# Patient Record
Sex: FEMALE | Race: BLACK OR AFRICAN AMERICAN | Employment: OTHER | URBAN - METROPOLITAN AREA
[De-identification: names, ages, dates, MRNs, and addresses within clinical notes are randomized per-mention and may not be internally consistent; named-entity substitution may affect disease eponyms.]

---

## 2017-01-05 DIAGNOSIS — M62.838 MUSCLE SPASM: ICD-10-CM

## 2017-01-05 DIAGNOSIS — M06.9 RHEUMATOID ARTHRITIS, INVOLVING UNSPECIFIED SITE, UNSPECIFIED RHEUMATOID FACTOR PRESENCE: Primary | ICD-10-CM

## 2017-01-05 RX ORDER — CYCLOBENZAPRINE HCL 10 MG
TABLET ORAL
Qty: 90 TAB | Refills: 3 | Status: SHIPPED | COMMUNITY
Start: 2017-01-05 | End: 2017-07-07 | Stop reason: SDUPTHER

## 2017-01-05 RX ORDER — PAROXETINE HYDROCHLORIDE 20 MG/1
TABLET, FILM COATED ORAL
Qty: 90 TAB | Refills: 1 | Status: SHIPPED | OUTPATIENT
Start: 2017-01-05 | End: 2017-07-05 | Stop reason: SDUPTHER

## 2017-01-05 RX ORDER — METFORMIN HYDROCHLORIDE 500 MG/1
TABLET, EXTENDED RELEASE ORAL
Qty: 180 TAB | Refills: 1 | Status: SHIPPED | OUTPATIENT
Start: 2017-01-05 | End: 2017-07-05 | Stop reason: SDUPTHER

## 2017-01-20 DIAGNOSIS — G47.00 INSOMNIA, UNSPECIFIED TYPE: ICD-10-CM

## 2017-01-23 RX ORDER — ZOLPIDEM TARTRATE 6.25 MG/1
TABLET, FILM COATED, EXTENDED RELEASE ORAL
Qty: 90 TAB | Refills: 0 | Status: SHIPPED | OUTPATIENT
Start: 2017-01-23 | End: 2017-03-21 | Stop reason: SDUPTHER

## 2017-02-02 RX ORDER — MELOXICAM 7.5 MG/1
TABLET ORAL
Qty: 90 TAB | Refills: 0 | Status: SHIPPED | OUTPATIENT
Start: 2017-02-02 | End: 2017-05-01 | Stop reason: SDUPTHER

## 2017-03-20 RX ORDER — VALSARTAN AND HYDROCHLOROTHIAZIDE 80; 12.5 MG/1; MG/1
TABLET, FILM COATED ORAL
Qty: 90 TAB | Refills: 0 | Status: SHIPPED | OUTPATIENT
Start: 2017-03-20 | End: 2017-06-19 | Stop reason: SDUPTHER

## 2017-03-21 DIAGNOSIS — G47.00 INSOMNIA, UNSPECIFIED TYPE: ICD-10-CM

## 2017-03-23 RX ORDER — ZOLPIDEM TARTRATE 6.25 MG/1
TABLET, FILM COATED, EXTENDED RELEASE ORAL
Qty: 90 TAB | Refills: 0 | Status: SHIPPED | OUTPATIENT
Start: 2017-03-23 | End: 2017-07-17 | Stop reason: SDUPTHER

## 2017-05-01 RX ORDER — MELOXICAM 7.5 MG/1
TABLET ORAL
Qty: 90 TAB | Refills: 0 | Status: SHIPPED | OUTPATIENT
Start: 2017-05-01 | End: 2017-05-08 | Stop reason: SDUPTHER

## 2017-06-19 RX ORDER — VALSARTAN AND HYDROCHLOROTHIAZIDE 80; 12.5 MG/1; MG/1
TABLET, FILM COATED ORAL
Qty: 90 TAB | Refills: 0 | Status: SHIPPED | OUTPATIENT
Start: 2017-06-19 | End: 2017-09-22 | Stop reason: SDUPTHER

## 2017-06-19 RX ORDER — MONTELUKAST SODIUM 10 MG/1
TABLET ORAL
Qty: 90 TAB | Refills: 0 | Status: SHIPPED | OUTPATIENT
Start: 2017-06-19 | End: 2017-09-18 | Stop reason: SDUPTHER

## 2017-07-05 RX ORDER — PAROXETINE HYDROCHLORIDE 20 MG/1
TABLET, FILM COATED ORAL
Qty: 90 TAB | Refills: 0 | Status: SHIPPED | OUTPATIENT
Start: 2017-07-05 | End: 2017-10-03 | Stop reason: SDUPTHER

## 2017-07-05 RX ORDER — METFORMIN HYDROCHLORIDE 500 MG/1
TABLET, EXTENDED RELEASE ORAL
Qty: 180 TAB | Refills: 0 | Status: SHIPPED | OUTPATIENT
Start: 2017-07-05 | End: 2017-10-03 | Stop reason: SDUPTHER

## 2017-07-17 DIAGNOSIS — G47.00 INSOMNIA, UNSPECIFIED TYPE: ICD-10-CM

## 2017-07-17 RX ORDER — ZOLPIDEM TARTRATE 6.25 MG/1
TABLET, FILM COATED, EXTENDED RELEASE ORAL
Qty: 90 TAB | Refills: 0 | Status: SHIPPED | OUTPATIENT
Start: 2017-07-17 | End: 2017-10-17 | Stop reason: SDUPTHER

## 2017-09-18 RX ORDER — MONTELUKAST SODIUM 10 MG/1
TABLET ORAL
Qty: 90 TAB | Refills: 0 | Status: SHIPPED | OUTPATIENT
Start: 2017-09-18 | End: 2018-03-15 | Stop reason: SDUPTHER

## 2017-09-19 DIAGNOSIS — G47.00 INSOMNIA, UNSPECIFIED TYPE: ICD-10-CM

## 2017-09-20 RX ORDER — VALSARTAN AND HYDROCHLOROTHIAZIDE 80; 12.5 MG/1; MG/1
TABLET, FILM COATED ORAL
Qty: 90 TAB | Refills: 0 | OUTPATIENT
Start: 2017-09-20

## 2017-09-22 RX ORDER — VALSARTAN AND HYDROCHLOROTHIAZIDE 80; 12.5 MG/1; MG/1
TABLET, FILM COATED ORAL
Qty: 90 TAB | Refills: 3 | Status: SHIPPED | OUTPATIENT
Start: 2017-09-22 | End: 2018-09-17 | Stop reason: SDUPTHER

## 2017-09-28 DIAGNOSIS — M06.9 RHEUMATOID ARTHRITIS, INVOLVING UNSPECIFIED SITE, UNSPECIFIED RHEUMATOID FACTOR PRESENCE: ICD-10-CM

## 2017-09-28 DIAGNOSIS — M62.838 MUSCLE SPASM: ICD-10-CM

## 2017-09-30 RX ORDER — CYCLOBENZAPRINE HCL 10 MG
TABLET ORAL
Qty: 90 TAB | Refills: 1 | Status: SHIPPED | OUTPATIENT
Start: 2017-09-30 | End: 2017-12-29 | Stop reason: SDUPTHER

## 2017-10-17 DIAGNOSIS — G47.00 INSOMNIA, UNSPECIFIED TYPE: ICD-10-CM

## 2017-10-17 RX ORDER — ZOLPIDEM TARTRATE 6.25 MG/1
TABLET, FILM COATED, EXTENDED RELEASE ORAL
Qty: 90 TAB | Refills: 0 | Status: SHIPPED | OUTPATIENT
Start: 2017-10-17 | End: 2017-10-23 | Stop reason: SDUPTHER

## 2017-10-23 DIAGNOSIS — G47.00 INSOMNIA, UNSPECIFIED TYPE: ICD-10-CM

## 2017-10-23 RX ORDER — ZOLPIDEM TARTRATE 6.25 MG/1
TABLET, FILM COATED, EXTENDED RELEASE ORAL
Qty: 90 TAB | Refills: 0 | Status: SHIPPED | OUTPATIENT
Start: 2017-10-23 | End: 2018-01-17 | Stop reason: SDUPTHER

## 2017-12-08 RX ORDER — MELOXICAM 7.5 MG/1
7.5 TABLET ORAL
Qty: 90 TAB | Refills: 0 | OUTPATIENT
Start: 2017-12-08

## 2017-12-08 NOTE — TELEPHONE ENCOUNTER
Requested Prescriptions     Pending Prescriptions Disp Refills    meloxicam (MOBIC) 7.5 mg tablet 90 Tab 0     Sig: Take 1 Tab by mouth daily as needed for Pain. Take with food. APPOINTMENT IS REQUIRED BEFORE NEXT REFILL.         Made pt aware of prior note from Dr Tami Funes needed before next refill\"     Pt said have ov scheduled for 12/26/2017

## 2018-01-17 ENCOUNTER — OFFICE VISIT (OUTPATIENT)
Dept: FAMILY MEDICINE CLINIC | Age: 63
End: 2018-01-17

## 2018-01-17 VITALS
BODY MASS INDEX: 22.05 KG/M2 | RESPIRATION RATE: 20 BRPM | HEART RATE: 101 BPM | SYSTOLIC BLOOD PRESSURE: 114 MMHG | DIASTOLIC BLOOD PRESSURE: 71 MMHG | WEIGHT: 154 LBS | OXYGEN SATURATION: 98 % | HEIGHT: 70 IN | TEMPERATURE: 98.6 F

## 2018-01-17 DIAGNOSIS — J30.1 SEASONAL ALLERGIC RHINITIS DUE TO POLLEN, UNSPECIFIED CHRONICITY: ICD-10-CM

## 2018-01-17 DIAGNOSIS — G47.00 INSOMNIA, UNSPECIFIED TYPE: ICD-10-CM

## 2018-01-17 DIAGNOSIS — J00 COMMON COLD: ICD-10-CM

## 2018-01-17 DIAGNOSIS — M06.9 RHEUMATOID ARTHRITIS, INVOLVING UNSPECIFIED SITE, UNSPECIFIED RHEUMATOID FACTOR PRESENCE: Primary | ICD-10-CM

## 2018-01-17 RX ORDER — ZOLPIDEM TARTRATE 6.25 MG/1
TABLET, FILM COATED, EXTENDED RELEASE ORAL
Qty: 90 TAB | Refills: 0 | Status: SHIPPED | OUTPATIENT
Start: 2018-01-17 | End: 2018-06-19 | Stop reason: SDUPTHER

## 2018-01-17 RX ORDER — MELOXICAM 7.5 MG/1
TABLET ORAL
Qty: 90 TAB | Refills: 0 | Status: SHIPPED | OUTPATIENT
Start: 2018-01-17 | End: 2018-03-15 | Stop reason: SDUPTHER

## 2018-01-17 RX ORDER — FLUTICASONE PROPIONATE 50 MCG
SPRAY, SUSPENSION (ML) NASAL
Qty: 48 G | Refills: 0 | Status: SHIPPED | OUTPATIENT
Start: 2018-01-17 | End: 2019-04-12 | Stop reason: SDUPTHER

## 2018-01-17 NOTE — MR AVS SNAPSHOT
[address] [place] [address] 
 
 
 [address] [address] [address] [address] 
[phone] Patient: Armand Chirinos 
MRN: [id number]:[id number] Visit Information Date & Time Provider Department Dept. Phone Encounter #  
 1/17/2018 11:00 AM Jorge Calderón NP [address] [place] [address] [id number] Upcoming Health Maintenance Date Due  
 EYE EXAM RETINAL OR DILATED Q1 11/12/1964 BREAST CANCER SCRN MAMMOGRAM 5/9/2013 ZOSTER VACCINE AGE 60> 9/12/2014 PAP AKA CERVICAL CYTOLOGY 11/24/2015 HEMOGLOBIN A1C Q6M 2/10/2017 FOOT EXAM Q1 7/14/2017 Influenza Age 5 to Adult 8/1/2017 MICROALBUMIN Q1 8/10/2017 LIPID PANEL Q1 12/22/2017 DTaP/Tdap/Td series (2 - Td) 11/24/2024 COLONOSCOPY 3/3/2025 Allergies as of 1/17/2018  Review Complete On: 1/17/2018 By: Radha Salgado Severity Noted Reaction Type Reactions Grass Pollen Low 09/29/2015    Runny Nose, Sneezing Current Immunizations  Reviewed on 12/22/2016 Name Date Influenza Vaccine (Quad) PF 12/22/2016, 8/25/2015 Influenza Vaccine PF 9/27/2013 Pneumococcal Conjugate (PCV-13) 10/22/2015 Tdap 11/24/2014 Not reviewed this visit You Were Diagnosed With   
  
 Codes Comments Rheumatoid arthritis, involving unspecified site, unspecified rheumatoid factor presence ([facility]    -  Primary ICD-10-CM: M06.9 ICD-9-CM: 714.0 Insomnia, unspecified type     ICD-10-CM: G47.00 ICD-9-CM: 780.52 Seasonal allergic rhinitis due to pollen, unspecified chronicity     ICD-10-CM: J30.1 ICD-9-CM: 477.0 Common cold     ICD-10-CM: Gwenezhao Mcekon ICD-9-CM: 619 Vitals BP Pulse Temp Resp Height(growth percentile) Weight(growth percentile) 114/71 (BP 1 Location: Right arm, BP Patient Position: Sitting) (!) 101 98.6 °F (37 °C) (Oral) 20 5' 10\" (1.778 m) 154 lb (69.9 kg) LMP SpO2 BMI OB Status Smoking Status 01/20/2007 98% 22.1 kg/m2 Postmenopausal Never Smoker BMI and BSA Data Body Mass Index Body Surface Area  
 22.1 kg/m 2 1.86 m 2 Preferred Pharmacy Pharmacy Name Phone [facility] [phone] Your Updated Medication List  
  
   
This list is accurate as of: 1/17/18 11:34 AM.  Always use your most recent med list.  
  
  
  
  
 acetaminophen-codeine 300-30 mg per tablet Commonly known as:  TYLENOL #3  
TAKE 1 TABLET BY MOUTH EVERY 6 HOURS AS NEEDED FOR PAIN  
  
 ALLEGRA PO Take 180 mg by mouth daily as needed. aspirin 81 mg chewable tablet Take 81 mg by mouth daily. Blood-Glucose Meter monitoring kit Freestyle meter. Check fasting glucose daily. cyclobenzaprine 10 mg tablet Commonly known as:  FLEXERIL Take 1 Tab by mouth three (3) times daily as needed for Muscle Spasm(s). Needs appointment no further refills until appointment  
  
 fluticasone 50 mcg/actuation nasal spray Commonly known as:  FLONASE  
USE 2 SPRAYS IN EACH NOSTRIL ONCE DAILY IF NEEDED FOR RUNNY NOSE OR ALLERGIES  
  
 glucose blood VI test strips strip Commonly known as:  blood glucose test  
To use with freestyle meter Lancets Misc Check glucose 2 times daily Lancing Device with Lancets Kit To use with freestyle meter  
  
 meloxicam 7.5 mg tablet Commonly known as:  MOBIC Take with food. metFORMIN  mg tablet Commonly known as:  GLUCOPHAGE XR  
TAKE 1 TABLET TWICE A DAY WITH MEALS  
  
 montelukast 10 mg tablet Commonly known as:  SINGULAIR  
TAKE 1 TABLET NIGHTLY PARoxetine 20 mg tablet Commonly known as:  PAXIL TAKE 1 TABLET DAILY  
  
 valsartan-hydroCHLOROthiazide 80-12.5 mg per tablet Commonly known as:  DIOVAN-HCT  
TAKE 1 TABLET DAILY  
  
 zolpidem CR 6.25 mg tablet Commonly known as:  AMBIEN CR  
take 1 tablet by mouth at bedtime for sleep Prescriptions Printed Refills  
 zolpidem CR (AMBIEN CR) 6.25 mg tablet 0 Sig: take 1 tablet by mouth at bedtime for sleep Class: Print Prescriptions Sent to Pharmacy Refills  
 meloxicam (MOBIC) 7.5 mg tablet 0 Sig: Take with food. Class: Normal  
 Pharmacy: [address], [address] [place] [address] Ph #: [phone]  
 fluticasone (FLONASE) 50 mcg/actuation nasal spray 0 Sig: USE 2 SPRAYS IN EACH NOSTRIL ONCE DAILY IF NEEDED FOR RUNNY NOSE OR ALLERGIES Class: Normal  
 Pharmacy: [address], [address] Ph #: [phone] Patient Instructions May take tylenol as every 4-6 hours as needed for pain. Pt advised no NSAIDS (ibuprophen, advil, Motrin, Aleve) while taking Mobic. Introducing [address]! Merle Mancilla introduces [employer] patient portal. Now you can access parts of your medical record, email your doctor's office, and request medication refills online. 1. In your internet browser, go to [email or web address] 2. Click on the First Time User? Click Here link in the Sign In box. You will see the New Member Sign Up page. 3. Enter your [employer] Access Code exactly as it appears below. You will not need to use this code after youve completed the sign-up process. If you do not sign up before the expiration date, you must request a new code. · [employer] Access Code: [email or web address] Expires: 4/17/2018 11:34 AM 
 
4. Enter the last four digits of your Social Security Number (xxxx) and Date of Birth (mm/dd/yyyy) as indicated and click Submit. You will be taken to the next sign-up page. 5. Create a TFG Card Solutionst ID. This will be your [employer] login ID and cannot be changed, so think of one that is secure and easy to remember. 6. Create a [employer] password. You can change your password at any time. 7. Enter your Password Reset Question and Answer.  This can be used at a later time if you forget your password. 8. Enter your e-mail address. You will receive e-mail notification when new information is available in [address] 9. Click Sign Up. You can now view and download portions of your medical record. 10. Click the Download Summary menu link to download a portable copy of your medical information. If you have questions, please visit the Frequently Asked Questions section of the [employer] website. Remember, [employer] is NOT to be used for urgent needs. For medical emergencies, dial 911. Now available from your iPhone and Android! Please provide this summary of care documentation to your next provider. Your primary care clinician is listed as [address]. If you have any questions after today's visit, please call [phone].

## 2018-01-17 NOTE — PATIENT INSTRUCTIONS
May take tylenol as every 4-6 hours as needed for pain. Pt advised no NSAIDS (ibuprophen, advil, Motrin, Aleve) while taking Mobic.

## 2018-01-17 NOTE — PROGRESS NOTES
1. Have you been to the ER, urgent care clinic since your last visit? Hospitalized since your last visit? Yes When: 1/7/18 for URI and arthritis pain at [facility] ER    2. Have you seen or consulted any other health care providers outside of the [address] [place] [address] since your last visit? Include any pap smears or colon screening.  No

## 2018-02-18 DIAGNOSIS — M62.838 MUSCLE SPASM: ICD-10-CM

## 2018-02-18 DIAGNOSIS — M06.9 RHEUMATOID ARTHRITIS, INVOLVING UNSPECIFIED SITE, UNSPECIFIED RHEUMATOID FACTOR PRESENCE: ICD-10-CM

## 2018-02-19 RX ORDER — CYCLOBENZAPRINE HCL 10 MG
TABLET ORAL
Qty: 90 TAB | Refills: 0 | OUTPATIENT
Start: 2018-02-19

## 2018-03-08 DIAGNOSIS — M06.9 RHEUMATOID ARTHRITIS, INVOLVING UNSPECIFIED SITE, UNSPECIFIED RHEUMATOID FACTOR PRESENCE: ICD-10-CM

## 2018-03-08 DIAGNOSIS — M62.838 MUSCLE SPASM: ICD-10-CM

## 2018-03-08 RX ORDER — CYCLOBENZAPRINE HCL 10 MG
10 TABLET ORAL
Qty: 30 TAB | Refills: 0 | OUTPATIENT
Start: 2018-03-08

## 2018-03-08 RX ORDER — METFORMIN HYDROCHLORIDE 500 MG/1
TABLET, EXTENDED RELEASE ORAL
Qty: 180 TAB | Refills: 0 | OUTPATIENT
Start: 2018-03-08

## 2018-03-08 RX ORDER — PAROXETINE HYDROCHLORIDE 20 MG/1
TABLET, FILM COATED ORAL
Qty: 90 TAB | Refills: 0 | OUTPATIENT
Start: 2018-03-08

## 2018-03-09 RX ORDER — METFORMIN HYDROCHLORIDE 500 MG/1
TABLET, EXTENDED RELEASE ORAL
Qty: 60 TAB | Refills: 0 | OUTPATIENT
Start: 2018-03-09

## 2018-03-09 RX ORDER — CYCLOBENZAPRINE HCL 10 MG
10 TABLET ORAL
Qty: 90 TAB | Refills: 0 | OUTPATIENT
Start: 2018-03-09

## 2018-03-09 RX ORDER — PAROXETINE HYDROCHLORIDE 20 MG/1
TABLET, FILM COATED ORAL
Qty: 30 TAB | Refills: 0 | OUTPATIENT
Start: 2018-03-09

## 2018-03-09 NOTE — TELEPHONE ENCOUNTER
Pt has made an appt for Thursday, March 15, 2018 08:00 AM she would like the 30 day refill to her pharmacy

## 2018-03-15 ENCOUNTER — HOSPITAL ENCOUNTER (OUTPATIENT)
Dept: LAB | Age: 63
Discharge: HOME | End: 2018-03-15
Payer: OTHER GOVERNMENT

## 2018-03-15 ENCOUNTER — OFFICE VISIT (OUTPATIENT)
Dept: FAMILY MEDICINE CLINIC | Age: 63
End: 2018-03-15

## 2018-03-15 VITALS
TEMPERATURE: 98 F | HEIGHT: 70 IN | SYSTOLIC BLOOD PRESSURE: 129 MMHG | DIASTOLIC BLOOD PRESSURE: 77 MMHG | OXYGEN SATURATION: 96 % | BODY MASS INDEX: 22.62 KG/M2 | HEART RATE: 104 BPM | WEIGHT: 158 LBS | RESPIRATION RATE: 16 BRPM

## 2018-03-15 DIAGNOSIS — I10 ESSENTIAL HYPERTENSION WITH GOAL BLOOD PRESSURE LESS THAN 130/80: ICD-10-CM

## 2018-03-15 DIAGNOSIS — M62.838 MUSCLE SPASM: ICD-10-CM

## 2018-03-15 DIAGNOSIS — Z23 ENCOUNTER FOR IMMUNIZATION: ICD-10-CM

## 2018-03-15 DIAGNOSIS — E11.9 TYPE 2 DIABETES MELLITUS WITHOUT COMPLICATION, WITHOUT LONG-TERM CURRENT USE OF INSULIN (HCC): ICD-10-CM

## 2018-03-15 DIAGNOSIS — M06.9 RHEUMATOID ARTHRITIS, INVOLVING UNSPECIFIED SITE, UNSPECIFIED RHEUMATOID FACTOR PRESENCE: Primary | ICD-10-CM

## 2018-03-15 DIAGNOSIS — E11.9 NEW ONSET TYPE 2 DIABETES MELLITUS (HCC): ICD-10-CM

## 2018-03-15 DIAGNOSIS — J30.1 ACUTE SEASONAL ALLERGIC RHINITIS DUE TO POLLEN: ICD-10-CM

## 2018-03-15 DIAGNOSIS — G47.00 INSOMNIA, UNSPECIFIED TYPE: ICD-10-CM

## 2018-03-15 LAB
ALBUMIN SERPL-MCNC: 4.1 G/DL (ref 3.4–5)
ALBUMIN/GLOB SERPL: 1.1 {RATIO} (ref 0.8–1.7)
ALP SERPL-CCNC: 66 U/L (ref 45–117)
ALT SERPL-CCNC: 33 U/L (ref 13–56)
ANION GAP SERPL CALC-SCNC: 7 MMOL/L (ref 3–18)
AST SERPL-CCNC: 22 U/L (ref 15–37)
BILIRUB SERPL-MCNC: 0.5 MG/DL (ref 0.2–1)
BUN SERPL-MCNC: 14 MG/DL (ref 7–18)
BUN/CREAT SERPL: 19 (ref 12–20)
CALCIUM SERPL-MCNC: 9.6 MG/DL (ref 8.5–10.1)
CHLORIDE SERPL-SCNC: 100 MMOL/L (ref 100–108)
CHOLEST SERPL-MCNC: 102 MG/DL
CO2 SERPL-SCNC: 31 MMOL/L (ref 21–32)
CREAT SERPL-MCNC: 0.75 MG/DL (ref 0.6–1.3)
EST. AVERAGE GLUCOSE BLD GHB EST-MCNC: 131 MG/DL
GLOBULIN SER CALC-MCNC: 3.8 G/DL (ref 2–4)
GLUCOSE SERPL-MCNC: 111 MG/DL (ref 74–99)
HBA1C MFR BLD: 6.2 % (ref 4.2–5.6)
HDLC SERPL-MCNC: 36 MG/DL (ref 40–60)
HDLC SERPL: 2.8 {RATIO} (ref 0–5)
LDLC SERPL CALC-MCNC: 41 MG/DL (ref 0–100)
LIPID PROFILE,FLP: ABNORMAL
POTASSIUM SERPL-SCNC: 4.2 MMOL/L (ref 3.5–5.5)
PROT SERPL-MCNC: 7.9 G/DL (ref 6.4–8.2)
SODIUM SERPL-SCNC: 138 MMOL/L (ref 136–145)
TRIGL SERPL-MCNC: 125 MG/DL (ref ?–150)
VLDLC SERPL CALC-MCNC: 25 MG/DL

## 2018-03-15 PROCEDURE — 82043 UR ALBUMIN QUANTITATIVE: CPT | Performed by: NURSE PRACTITIONER

## 2018-03-15 PROCEDURE — 83036 HEMOGLOBIN GLYCOSYLATED A1C: CPT | Performed by: NURSE PRACTITIONER

## 2018-03-15 PROCEDURE — 80053 COMPREHEN METABOLIC PANEL: CPT | Performed by: NURSE PRACTITIONER

## 2018-03-15 PROCEDURE — 80061 LIPID PANEL: CPT | Performed by: NURSE PRACTITIONER

## 2018-03-15 RX ORDER — DICLOFENAC SODIUM 10 MG/G
2 GEL TOPICAL 4 TIMES DAILY
Qty: 2 G | Refills: 11 | Status: SHIPPED | OUTPATIENT
Start: 2018-03-15 | End: 2020-02-03

## 2018-03-15 RX ORDER — LANCETS
EACH MISCELLANEOUS
Qty: 200 EACH | Refills: 11 | Status: SHIPPED | OUTPATIENT
Start: 2018-03-15 | End: 2020-02-03

## 2018-03-15 RX ORDER — MONTELUKAST SODIUM 10 MG/1
10 TABLET ORAL DAILY
Qty: 90 TAB | Refills: 1 | Status: SHIPPED | OUTPATIENT
Start: 2018-03-15 | End: 2020-02-03

## 2018-03-15 RX ORDER — MELOXICAM 15 MG/1
TABLET ORAL
Qty: 90 TAB | Refills: 1 | Status: SHIPPED | OUTPATIENT
Start: 2018-03-15 | End: 2018-09-17 | Stop reason: SDUPTHER

## 2018-03-15 RX ORDER — PAROXETINE HYDROCHLORIDE 20 MG/1
20 TABLET, FILM COATED ORAL DAILY
Qty: 90 TAB | Refills: 1 | Status: SHIPPED | OUTPATIENT
Start: 2018-03-15 | End: 2018-08-01 | Stop reason: SDUPTHER

## 2018-03-15 RX ORDER — CYCLOBENZAPRINE HCL 10 MG
10 TABLET ORAL
Qty: 90 TAB | Refills: 0 | Status: SHIPPED | OUTPATIENT
Start: 2018-03-15 | End: 2018-04-22 | Stop reason: SDUPTHER

## 2018-03-15 RX ORDER — INSULIN PUMP SYRINGE, 3 ML
EACH MISCELLANEOUS
Qty: 1 KIT | Refills: 0 | Status: SHIPPED | OUTPATIENT
Start: 2018-03-15 | End: 2020-02-03

## 2018-03-15 RX ORDER — METFORMIN HYDROCHLORIDE 500 MG/1
500 TABLET, EXTENDED RELEASE ORAL 2 TIMES DAILY
Qty: 180 TAB | Refills: 0 | Status: SHIPPED | OUTPATIENT
Start: 2018-03-15 | End: 2018-06-29 | Stop reason: SDUPTHER

## 2018-03-15 NOTE — PROGRESS NOTES
Patient is in the office today for medication evaluation. Patient is not taking the tylenol #3. Do you have an Advance Directive Yes  Do you want more information no    1. Have you been to the ER, urgent care clinic since your last visit? Hospitalized since your last visit? Yes [facility]    2. Have you seen or consulted any other health care providers outside of the [address] [place] [address] since your last visit? Include any pap smears or colon screening.  No

## 2018-03-15 NOTE — PROGRESS NOTES
HISTORY OF PRESENT ILLNESS  General Sanchez is a 60 y.o. female. Patient presents today for labs and med refills. HPI  Pt is requesting refills on her meds. She needs a new meter and test strips. She would like to increase her Mobic. When it is cold or raining, her joints hurts more. Review of Systems   Constitutional: Negative. HENT: Negative. Eyes: Negative. Cardiovascular: Negative. Musculoskeletal: Positive for joint pain (multiple) and myalgias. Psychiatric/Behavioral: Positive for depression (stable). Visit Vitals    /77 (BP 1 Location: Right arm, BP Patient Position: Sitting)    Pulse (!) 104    Temp 98 °F (36.7 °C) (Oral)    Resp 16    Ht 5' 10\" (1.778 m)    Wt 158 lb (71.7 kg)    LMP 01/20/2007    SpO2 96%    BMI 22.67 kg/m2   '  Physical Exam   Constitutional: She is oriented to person, place, and time. She appears well-developed. No distress. Neck: Normal range of motion. Neck supple. Cardiovascular: Normal rate and regular rhythm. Murmur heard. Pulmonary/Chest: Effort normal and breath sounds normal. No respiratory distress. She has no wheezes. She has no rales. Musculoskeletal: Normal range of motion. Neurological: She is alert and oriented to person, place, and time. No cranial nerve deficit. ASSESSMENT and PLAN    ICD-10-CM ICD-9-CM    1. Rheumatoid arthritis, involving unspecified site, unspecified rheumatoid factor presence ([facility]) M06.9 714.0 cyclobenzaprine (FLEXERIL) 10 mg tablet      meloxicam (MOBIC) 15 mg tablet      diclofenac (VOLTAREN) 1 % gel   2. Muscle spasm M62.838 728.85 cyclobenzaprine (FLEXERIL) 10 mg tablet   3. Insomnia, unspecified type G47.00 780.52    4. Type 2 diabetes mellitus without complication, without long-term current use of insulin ([facility]) E11.9 250.00 metFORMIN ER (GLUCOPHAGE XR) 500 mg tablet      MICROALBUMIN, UR, RAND W/ MICROALB/CREAT RATIO      HEMOGLOBIN A1C WITH EAG   5.  Essential hypertension with goal blood pressure less than 130/80 E37 157.9 METABOLIC PANEL, COMPREHENSIVE      LIPID PANEL   6. Acute seasonal allergic rhinitis due to pollen J30.1 477.0    7. New onset type 2 diabetes mellitus (HCC) E11.9 250.00 Blood-Glucose Meter monitoring kit      Lancets misc      glucose blood VI test strips (BLOOD GLUCOSE TEST) strip     PLAN:  We increased her Mobic fom 7.5 to 15 mg  We will try to get Voltaren cream approved. Pt may take tylenol as needed. Pt was asked to RTC in 6 months for chronic care.     Pt given after visit summary

## 2018-03-15 NOTE — PATIENT INSTRUCTIONS

## 2018-03-15 NOTE — MR AVS SNAPSHOT
[facility] 
 
 
 [address] [address] [address] [address] 
[phone] Patient: Kirt Fu 
MRN: [id number] NYL:75/29/[id number] Visit Information Date & Time Provider Department Dept. Phone Encounter #  
 3/15/2018  8:00 AM Duane Johnson NP [address] [phone] [id number] Upcoming Health Maintenance Date Due  
 EYE EXAM RETINAL OR DILATED Q1 11/12/1965 BREAST CANCER SCRN MAMMOGRAM 5/9/2013 ZOSTER VACCINE AGE 60> 9/12/2014 PAP AKA CERVICAL CYTOLOGY 11/24/2015 HEMOGLOBIN A1C Q6M 2/10/2017 FOOT EXAM Q1 7/14/2017 Influenza Age 5 to Adult 8/1/2017 MICROALBUMIN Q1 8/10/2017 LIPID PANEL Q1 12/22/2017 DTaP/Tdap/Td series (2 - Td) 11/24/2024 COLONOSCOPY 3/3/2025 Allergies as of 3/15/2018  Review Complete On: 3/15/2018 By: Duane Johnson NP Severity Noted Reaction Type Reactions Grass Pollen Low 09/29/2015    Runny Nose, Sneezing Current Immunizations  Reviewed on 12/22/2016 Name Date Influenza Vaccine (Quad) PF 12/22/2016, 8/25/2015 Influenza Vaccine PF 9/27/2013 Pneumococcal Conjugate (PCV-13) 10/22/2015 Tdap 11/24/2014 Not reviewed this visit You Were Diagnosed With   
  
 Codes Comments Rheumatoid arthritis, involving unspecified site, unspecified rheumatoid factor presence ([facility]    -  Primary ICD-10-CM: M06.9 ICD-9-CM: 714.0 Muscle spasm     ICD-10-CM: H07.378 ICD-9-CM: 728.85 Insomnia, unspecified type     ICD-10-CM: G47.00 ICD-9-CM: 780.52 Type 2 diabetes mellitus without complication, without long-term current use of insulin (HCC)     ICD-10-CM: E11.9 ICD-9-CM: 250.00 Essential hypertension with goal blood pressure less than 130/80     ICD-10-CM: I10 
ICD-9-CM: 401.9 Acute seasonal allergic rhinitis due to pollen     ICD-10-CM: J30.1 ICD-9-CM: 477.0 New onset type 2 diabetes mellitus ([facility]     ICD-10-CM: E11.9 ICD-9-CM: 250.00 Vitals BP Pulse Temp Resp Height(growth percentile) Weight(growth percentile) 129/77 (BP 1 Location: Right arm, BP Patient Position: Sitting) (!) 104 98 °F (36.7 °C) (Oral) 16 5' 10\" (1.778 m) 158 lb (71.7 kg) LMP SpO2 BMI OB Status Smoking Status 01/20/2007 96% 22.67 kg/m2 Postmenopausal Never Smoker BMI and BSA Data Body Mass Index Body Surface Area  
 22.67 kg/m 2 1.88 m 2 Preferred Pharmacy Pharmacy Name Phone [address] [facility] [phone] Your Updated Medication List  
  
   
This list is accurate as of 3/15/18  8:36 AM.  Always use your most recent med list.  
  
  
  
  
 acetaminophen-codeine 300-30 mg per tablet Commonly known as:  TYLENOL #3  
TAKE 1 TABLET BY MOUTH EVERY 6 HOURS AS NEEDED FOR PAIN  
  
 ALLEGRA PO Take 180 mg by mouth daily as needed. aspirin 81 mg chewable tablet Take 81 mg by mouth daily. Blood-Glucose Meter monitoring kit Freestyle meter. Check fasting glucose daily. cyclobenzaprine 10 mg tablet Commonly known as:  FLEXERIL Take 1 Tab by mouth three (3) times daily as needed for Muscle Spasm(s). Needs appointment no further refills until appointment  
  
 diclofenac 1 % Gel Commonly known as:  VOLTAREN Apply 2 g to affected area four (4) times daily. fluticasone 50 mcg/actuation nasal spray Commonly known as:  FLONASE  
USE 2 SPRAYS IN EACH NOSTRIL ONCE DAILY IF NEEDED FOR RUNNY NOSE OR ALLERGIES  
  
 glucose blood VI test strips strip Commonly known as:  blood glucose test  
To use with freestyle meter Lancets Misc Check glucose 2 times daily Lancing Device with Lancets Kit To use with freestyle meter  
  
 meloxicam 15 mg tablet Commonly known as:  MOBIC Take with food. metFORMIN  mg tablet Commonly known as:  GLUCOPHAGE XR Take 1 Tab by mouth two (2) times a day. montelukast 10 mg tablet Commonly known as:  SINGULAIR Take 1 Tab by mouth daily. PARoxetine 20 mg tablet Commonly known as:  PAXIL Take 1 Tab by mouth daily. valsartan-hydroCHLOROthiazide 80-12.5 mg per tablet Commonly known as:  DIOVAN-HCT  
TAKE 1 TABLET DAILY  
  
 zolpidem CR 6.25 mg tablet Commonly known as:  AMBIEN CR  
take 1 tablet by mouth at bedtime for sleep Prescriptions Printed Refills Blood-Glucose Meter monitoring kit 0 Sig: Freestyle meter. Check fasting glucose daily. Class: Print Lancets misc 11 Sig: Check glucose 2 times daily Class: Print  
 glucose blood VI test strips (BLOOD GLUCOSE TEST) strip 11 Sig: To use with freestyle meter Class: Print Prescriptions Sent to Pharmacy Refills  
 cyclobenzaprine (FLEXERIL) 10 mg tablet 0 Sig: Take 1 Tab by mouth three (3) times daily as needed for Muscle Spasm(s). Needs appointment no further refills until appointment Class: Normal  
 Pharmacy: [address], [address] Ph #: [phone] Route: Oral  
 PARoxetine (PAXIL) 20 mg tablet 1 Sig: Take 1 Tab by mouth daily. Class: Normal  
 Pharmacy: [address], [address] Ph #: [phone] Route: Oral  
 metFORMIN ER (GLUCOPHAGE XR) 500 mg tablet 0 Sig: Take 1 Tab by mouth two (2) times a day. Class: Normal  
 Pharmacy: [address], [address] Ph #: [phone] Route: Oral  
 montelukast (SINGULAIR) 10 mg tablet 1 Sig: Take 1 Tab by mouth daily. Class: Normal  
 Pharmacy: [address], [address] Ph #: [phone] Route: Oral  
 meloxicam (MOBIC) 15 mg tablet 1 Sig: Take with food.   
 Class: Normal  
 Pharmacy: [address], [address] Ph #: [phone]  
 diclofenac (VOLTAREN) 1 % gel 11  
 Sig: Apply 2 g to affected area four (4) times daily. Class: Normal  
 Pharmacy: [address], [address] [place] [address] #: [phone] Route: Topical  
  
To-Do List   
 03/15/2018 Lab:  HEMOGLOBIN A1C WITH EAG   
  
 03/15/2018 Lab:  LIPID PANEL   
  
 03/15/2018 Lab:  METABOLIC PANEL, COMPREHENSIVE   
  
 03/15/2018 Lab:  MICROALBUMIN, UR, RAND W/ MICROALB/CREAT RATIO Introducing [facility]! Jocelynn Hernández introduces [employer] patient portal. Now you can access parts of your medical record, email your doctor's office, and request medication refills online. 1. In your internet browser, go to [email or web address] 2. Click on the First Time User? Click Here link in the Sign In box. You will see the New Member Sign Up page. 3. Enter your [employer] Access Code exactly as it appears below. You will not need to use this code after youve completed the sign-up process. If you do not sign up before the expiration date, you must request a new code. · [employer] Access Code: [email or web address] Expires: 4/17/2018 12:34 PM 
 
4. Enter the last four digits of your Social Security Number (xxxx) and Date of Birth (mm/dd/yyyy) as indicated and click Submit. You will be taken to the next sign-up page. 5. Create a [employer] ID. This will be your [employer] login ID and cannot be changed, so think of one that is secure and easy to remember. 6. Create a [employer] password. You can change your password at any time. 7. Enter your Password Reset Question and Answer. This can be used at a later time if you forget your password. 8. Enter your e-mail address. You will receive e-mail notification when new information is available in [address] 9. Click Sign Up. You can now view and download portions of your medical record. 10. Click the Download Summary menu link to download a portable copy of your medical information. If you have questions, please visit the Frequently Asked Questions section of the [employer] website. Remember, [employer] is NOT to be used for urgent needs. For medical emergencies, dial 911. Now available from your iPhone and Android! Please provide this summary of care documentation to your next provider. Your primary care clinician is listed as [address]. If you have any questions after today's visit, please call [phone].

## 2018-03-16 PROBLEM — E11.21 TYPE 2 DIABETES WITH NEPHROPATHY (HCC): Status: ACTIVE | Noted: 2018-03-16

## 2018-03-16 LAB
CREAT UR-MCNC: 145.48 MG/DL (ref 30–125)
MICROALBUMIN UR-MCNC: 6.3 MG/DL (ref 0–3)
MICROALBUMIN/CREAT UR-RTO: 43 MG/G (ref 0–30)

## 2018-03-21 NOTE — PROGRESS NOTES
Please let Pt know her glucose was 111 and her HgbA1C is 6.2 which is lower than a year ago. Very good. Her cholesterol numbers look good. Her kidney functions are fine in her comp but her random microalbumin is off a bit. We need to repeat labs when I see her in 6 months but we do not need to repeat the microalbumin for one year.

## 2018-03-23 ENCOUNTER — TELEPHONE (OUTPATIENT)
Dept: FAMILY MEDICINE CLINIC | Age: 63
End: 2018-03-23

## 2018-03-23 NOTE — TELEPHONE ENCOUNTER
----- Message from Angella Abdalla NP sent at 3/20/2018  8:02 PM EDT -----  Please let Pt know her glucose was 111 and her HgbA1C is 6.2 which is lower than a year ago. Very good. Her cholesterol numbers look good. Her kidney functions are fine in her comp but her random microalbumin is off a bit. We need to repeat labs when I see her in 6 months but we do not need to repeat the microalbumin for one year.

## 2018-04-22 DIAGNOSIS — M06.9 RHEUMATOID ARTHRITIS, INVOLVING UNSPECIFIED SITE, UNSPECIFIED RHEUMATOID FACTOR PRESENCE: ICD-10-CM

## 2018-04-22 DIAGNOSIS — M62.838 MUSCLE SPASM: ICD-10-CM

## 2018-04-25 RX ORDER — CYCLOBENZAPRINE HCL 10 MG
TABLET ORAL
Qty: 90 TAB | Refills: 0 | Status: SHIPPED | OUTPATIENT
Start: 2018-04-25 | End: 2018-06-29 | Stop reason: SDUPTHER

## 2018-06-19 DIAGNOSIS — G47.00 INSOMNIA, UNSPECIFIED TYPE: ICD-10-CM

## 2018-06-19 RX ORDER — ZOLPIDEM TARTRATE 6.25 MG/1
TABLET, FILM COATED, EXTENDED RELEASE ORAL
Qty: 90 TAB | Refills: 0 | Status: SHIPPED | OUTPATIENT
Start: 2018-06-19 | End: 2018-10-22 | Stop reason: SDUPTHER

## 2018-06-29 DIAGNOSIS — M62.838 MUSCLE SPASM: ICD-10-CM

## 2018-06-29 DIAGNOSIS — E11.9 TYPE 2 DIABETES MELLITUS WITHOUT COMPLICATION, WITHOUT LONG-TERM CURRENT USE OF INSULIN (HCC): ICD-10-CM

## 2018-06-29 DIAGNOSIS — M06.9 RHEUMATOID ARTHRITIS, INVOLVING UNSPECIFIED SITE, UNSPECIFIED RHEUMATOID FACTOR PRESENCE: ICD-10-CM

## 2018-06-29 NOTE — TELEPHONE ENCOUNTER
Pt aware prescription is available for  at the office.      appt scheduled for 09/17/2018 and new ph number [phone] has been updated

## 2018-07-03 RX ORDER — METFORMIN HYDROCHLORIDE 500 MG/1
TABLET, EXTENDED RELEASE ORAL
Qty: 180 TAB | Refills: 0 | Status: SHIPPED | OUTPATIENT
Start: 2018-07-03 | End: 2018-08-01 | Stop reason: SDUPTHER

## 2018-07-03 RX ORDER — CYCLOBENZAPRINE HCL 10 MG
TABLET ORAL
Qty: 90 TAB | Refills: 0 | Status: SHIPPED | OUTPATIENT
Start: 2018-07-03 | End: 2018-09-17 | Stop reason: SDUPTHER

## 2018-09-17 ENCOUNTER — OFFICE VISIT (OUTPATIENT)
Dept: FAMILY MEDICINE CLINIC | Age: 63
End: 2018-09-17

## 2018-09-17 ENCOUNTER — HOSPITAL ENCOUNTER (OUTPATIENT)
Dept: LAB | Age: 63
Discharge: HOME | End: 2018-09-17
Payer: OTHER GOVERNMENT

## 2018-09-17 VITALS
TEMPERATURE: 98.8 F | HEIGHT: 70 IN | SYSTOLIC BLOOD PRESSURE: 129 MMHG | WEIGHT: 152.6 LBS | HEART RATE: 97 BPM | RESPIRATION RATE: 16 BRPM | BODY MASS INDEX: 21.85 KG/M2 | OXYGEN SATURATION: 97 % | DIASTOLIC BLOOD PRESSURE: 70 MMHG

## 2018-09-17 DIAGNOSIS — E11.9 TYPE 2 DIABETES MELLITUS WITHOUT COMPLICATION, WITHOUT LONG-TERM CURRENT USE OF INSULIN (HCC): ICD-10-CM

## 2018-09-17 DIAGNOSIS — M62.838 MUSCLE SPASM: ICD-10-CM

## 2018-09-17 DIAGNOSIS — E11.21 TYPE 2 DIABETES WITH NEPHROPATHY (HCC): ICD-10-CM

## 2018-09-17 DIAGNOSIS — I10 ESSENTIAL HYPERTENSION WITH GOAL BLOOD PRESSURE LESS THAN 130/80: ICD-10-CM

## 2018-09-17 DIAGNOSIS — M06.9 RHEUMATOID ARTHRITIS, INVOLVING UNSPECIFIED SITE, UNSPECIFIED RHEUMATOID FACTOR PRESENCE: ICD-10-CM

## 2018-09-17 DIAGNOSIS — Z12.31 BREAST CANCER SCREENING BY MAMMOGRAM: Primary | ICD-10-CM

## 2018-09-17 LAB
ALBUMIN SERPL-MCNC: 4.1 G/DL (ref 3.4–5)
ALBUMIN/GLOB SERPL: 1.2 {RATIO} (ref 0.8–1.7)
ALP SERPL-CCNC: 50 U/L (ref 45–117)
ALT SERPL-CCNC: 16 U/L (ref 13–56)
ANION GAP SERPL CALC-SCNC: 6 MMOL/L (ref 3–18)
AST SERPL-CCNC: 16 U/L (ref 15–37)
BILIRUB SERPL-MCNC: 0.4 MG/DL (ref 0.2–1)
BUN SERPL-MCNC: 13 MG/DL (ref 7–18)
BUN/CREAT SERPL: 15 (ref 12–20)
CALCIUM SERPL-MCNC: 10 MG/DL (ref 8.5–10.1)
CHLORIDE SERPL-SCNC: 104 MMOL/L (ref 100–108)
CHOLEST SERPL-MCNC: 134 MG/DL
CO2 SERPL-SCNC: 31 MMOL/L (ref 21–32)
CREAT SERPL-MCNC: 0.87 MG/DL (ref 0.6–1.3)
EST. AVERAGE GLUCOSE BLD GHB EST-MCNC: 128 MG/DL
GLOBULIN SER CALC-MCNC: 3.3 G/DL (ref 2–4)
GLUCOSE SERPL-MCNC: 71 MG/DL (ref 74–99)
HBA1C MFR BLD: 6.1 % (ref 4.2–5.6)
HDLC SERPL-MCNC: 51 MG/DL (ref 40–60)
HDLC SERPL: 2.6 {RATIO} (ref 0–5)
LDLC SERPL CALC-MCNC: 63.6 MG/DL (ref 0–100)
LIPID PROFILE,FLP: NORMAL
POTASSIUM SERPL-SCNC: 4.5 MMOL/L (ref 3.5–5.5)
PROT SERPL-MCNC: 7.4 G/DL (ref 6.4–8.2)
SODIUM SERPL-SCNC: 141 MMOL/L (ref 136–145)
TRIGL SERPL-MCNC: 97 MG/DL (ref ?–150)
VLDLC SERPL CALC-MCNC: 19.4 MG/DL

## 2018-09-17 PROCEDURE — 83036 HEMOGLOBIN GLYCOSYLATED A1C: CPT | Performed by: NURSE PRACTITIONER

## 2018-09-17 PROCEDURE — 36415 COLL VENOUS BLD VENIPUNCTURE: CPT | Performed by: NURSE PRACTITIONER

## 2018-09-17 PROCEDURE — 80053 COMPREHEN METABOLIC PANEL: CPT | Performed by: NURSE PRACTITIONER

## 2018-09-17 PROCEDURE — 80061 LIPID PANEL: CPT | Performed by: NURSE PRACTITIONER

## 2018-09-17 RX ORDER — VALSARTAN AND HYDROCHLOROTHIAZIDE 80; 12.5 MG/1; MG/1
TABLET, FILM COATED ORAL
Qty: 90 TAB | Refills: 1 | Status: SHIPPED | OUTPATIENT
Start: 2018-09-17 | End: 2019-03-18 | Stop reason: SDUPTHER

## 2018-09-17 RX ORDER — CYCLOBENZAPRINE HCL 10 MG
TABLET ORAL
Qty: 90 TAB | Refills: 1 | Status: SHIPPED | OUTPATIENT
Start: 2018-09-17 | End: 2018-11-14 | Stop reason: SDUPTHER

## 2018-09-17 RX ORDER — METFORMIN HYDROCHLORIDE 500 MG/1
TABLET, EXTENDED RELEASE ORAL
Qty: 180 TAB | Refills: 1 | Status: SHIPPED | OUTPATIENT
Start: 2018-09-17 | End: 2019-03-10 | Stop reason: SDUPTHER

## 2018-09-17 RX ORDER — MELOXICAM 15 MG/1
TABLET ORAL
Qty: 90 TAB | Refills: 1 | Status: SHIPPED | OUTPATIENT
Start: 2018-09-17 | End: 2019-06-05 | Stop reason: SDUPTHER

## 2018-09-17 NOTE — MR AVS SNAPSHOT
[address] [place] [address] 
 
 
 [address] [address] [address] [facility] [address] 
[phone] Patient: Joslyn King 
MRN: [id number] [id number] Visit Information Date & Time Provider Department Dept. Phone Encounter #  
 9/17/2018  9:00 AM Kem Alvares NP [address] [address] [facility] [id number] Follow-up Instructions Return in about 6 months (around 3/17/2019) for chronic care. Upcoming Health Maintenance Date Due  
 EYE EXAM RETINAL OR DILATED Q1 11/12/1965 BREAST CANCER SCRN MAMMOGRAM 5/9/2013 PAP AKA CERVICAL CYTOLOGY 11/24/2015 Influenza Age 5 to Adult 8/1/2018 HEMOGLOBIN A1C Q6M 9/15/2018 FOOT EXAM Q1 3/15/2019 MICROALBUMIN Q1 3/15/2019 LIPID PANEL Q1 3/15/2019 DTaP/Tdap/Td series (2 - Td) 11/24/2024 COLONOSCOPY 3/3/2025 Allergies as of 9/17/2018  Review Complete On: 9/17/2018 By: Jose Luis Hall LPN Severity Noted Reaction Type Reactions Grass Pollen Low 09/29/2015    Runny Nose, Sneezing Current Immunizations  Reviewed on 12/22/2016 Name Date Influenza Vaccine (Quad) PF 12/22/2016, 8/25/2015 Influenza Vaccine PF 9/27/2013 Pneumococcal Conjugate (PCV-13) 10/22/2015 Tdap 11/24/2014 Not reviewed this visit You Were Diagnosed With   
  
 Codes Comments Breast cancer screening by mammogram    -  Primary ICD-10-CM: Z12.31 
ICD-9-CM: V76.12 Type 2 diabetes mellitus without complication, without long-term current use of insulin (HCC)     ICD-10-CM: E11.9 ICD-9-CM: 250.00 Rheumatoid arthritis, involving unspecified site, unspecified [facility] factor [facility]     ICD-10-CM: M06.9 ICD-9-CM: 714.0 Muscle spasm     ICD-10-CM: G09.762 ICD-9-CM: 728.85 Essential hypertension with goal blood pressure less than 130/80     ICD-10-CM: I10 
ICD-9-CM: 401.9  Type 2 diabetes with nephropathy (HCC)     ICD-10-CM: E11.21 
ICD-9-CM: 250.40, 583.81   
  
 Vitals BP Pulse Temp Resp Height(growth percentile) Weight(growth percentile) 129/70 (BP 1 Location: Left arm) 97 98.8 °F (37.1 °C) (Oral) 16 5' 10\" (1.778 m) 152 lb 9.6 oz (69.2 kg) LMP SpO2 BMI OB Status Smoking Status 01/20/2007 97% 21.9 kg/m2 Postmenopausal Never Smoker BMI and BSA Data Body Mass Index Body Surface Area  
 21.9 kg/m 2 1.85 m 2 Preferred Pharmacy Pharmacy Name Phone [facility] [address], [address] [facility] [phone] Your Updated Medication List  
  
   
This list is accurate as of 9/17/18  9:24 AM.  Always use your most recent med list.  
  
  
  
  
 acetaminophen-codeine 300-30 mg per tablet Commonly known as:  TYLENOL #3  
TAKE 1 TABLET BY MOUTH EVERY 6 HOURS AS NEEDED FOR PAIN  
  
 ALLEGRA PO Take 180 mg by mouth daily as needed. aspirin 81 mg chewable tablet Take 81 mg by mouth daily. Blood-Glucose Meter monitoring kit Freestyle meter. Check fasting glucose daily. cyclobenzaprine 10 mg tablet Commonly known as:  FLEXERIL  
TAKE 1 TABLET THREE TIMES A DAY AS NEEDED FOR MUSCLE SPASMS. diclofenac 1 % Gel Commonly known as:  VOLTAREN Apply 2 g to affected area four (4) times daily. fluticasone 50 mcg/actuation nasal spray Commonly known as:  FLONASE  
USE 2 SPRAYS IN EACH NOSTRIL ONCE DAILY IF NEEDED FOR RUNNY NOSE OR ALLERGIES  
  
 glucose blood VI test strips strip Commonly known as:  blood glucose test  
To use with freestyle meter Lancets Misc Check glucose 2 times daily Lancing Device with Lancets Kit To use with freestyle meter  
  
 meloxicam 15 mg tablet Commonly known as:  MOBIC Take with food daily  
  
 metFORMIN  mg tablet Commonly known as:  GLUCOPHAGE XR  
take 1 tablet by mouth twice a day  
  
 montelukast 10 mg tablet Commonly known as:  SINGULAIR Take 1 Tab by mouth daily. PARoxetine 20 mg tablet Commonly known as:  PAXIL  
take 1 tablet by mouth once daily  
  
 valsartan-hydroCHLOROthiazide 80-12.5 mg per tablet Commonly known as:  DIOVAN-HCT  
TAKE 1 TABLET DAILY  
  
 zolpidem CR 6.25 mg tablet Commonly known as:  AMBIEN CR  
take 1 tablet by mouth at bedtime for sleep Prescriptions Sent to Pharmacy Refills  
 metFORMIN ER (GLUCOPHAGE XR) 500 mg tablet 1 Sig: take 1 tablet by mouth twice a day Class: Normal  
 Pharmacy: [address] Ph #: [phone]  
 meloxicam (MOBIC) 15 mg tablet 1 Sig: Take with food daily Class: Normal  
 Pharmacy: [address] Ph #: [phone]  
 cyclobenzaprine (FLEXERIL) 10 mg tablet 1 Sig: TAKE 1 TABLET THREE TIMES A DAY AS NEEDED FOR MUSCLE SPASMS. Class: Normal  
 Pharmacy: [address] Ph #: [phone]  
 valsartan-hydroCHLOROthiazide (DIOVAN-HCT) 80-12.5 mg per tablet 1 Sig: TAKE 1 TABLET DAILY Class: Normal  
 Pharmacy: [facility] [place] [address], [address] [place] [address] Ph #: [phone] Follow-up Instructions Return in about 6 months (around 3/17/2019) for chronic care. To-Do List   
 09/17/2018 Lab:  HEMOGLOBIN A1C WITH EAG   
  
 09/17/2018 Lab:  LIPID PANEL   
  
 09/17/2018 Imaging:  [facility] MAMMO BI SCREENING INCL CAD   
  
 09/17/2018 Lab:  METABOLIC PANEL, COMPREHENSIVE Patient Instructions Mammogram: About This Test 
What is it? A mammogram is an X-ray of the breast that is used to screen for breast cancer. This test can find tumors that are too small for you or your doctor to feel. Cancer is most easily treated and cured when it is found at an early stage. Why is this test done? A mammogram is done to: 
· Look for breast cancer in women who don't have symptoms. · Find breast cancer in women who have symptoms. Symptoms of breast cancer may include a lump or thickening in the breast, nipple discharge, or dimpling of the skin on one area of the breast. 
· Find an area of suspicious breast tissue to remove for an exam under a microscope (biopsy). How can you prepare for the test? 
· Tell your doctor if you: ¨ Are or might be pregnant. ¨ Are breastfeeding. ¨ Have breast implants. ¨ Have previously had a breast biopsy. · On the day of the test, don't use any deodorant, perfume, powders, or ointments. What happens before the test? 
· You will need to take off any jewelry that might interfere with the X-ray pictures. · You will need to take off your clothes above the waist. 
· You will be given a cloth or paper gown to use during the test. 
What happens during the test? 
· You usually stand during a mammogram. 
· One at a time, your breasts will be placed on a flat plate that contains the X-ray film. · Another plate is then pressed firmly against your breast to help flatten out the breast tissue. You may be asked to lift your arm. · For a few seconds while the X-ray picture is being taken, you will need to hold your breath. · At least two pictures are taken of each breast. One is taken from the top and one from the side. What else should you know about the test? 
· The X-ray plate will feel cold when you place your breast on it. Having your breasts flattened and squeezed isn't comfortable. But it is necessary to flatten out the breast tissue to get the best pictures. · Mammograms do not prevent breast cancer or reduce a woman's risk of developing cancer. · Most things that are found during a mammogram are not breast cancer. How long does the test take? · The test will take about 10 to 15 minutes. You may be in the clinic for up to an hour. What happens after the test? 
· You will probably be able to go home right away. · You can go back to your usual activities right away. Follow-up care is a key part of your treatment and safety. Be sure to make and go to all appointments, and call your doctor if you are having problems. It's also a good idea to keep a list of the medicines you take. Ask your doctor when you can expect to have your test results. Where can you learn more? Go to http://justyn-ben.info/. Enter Y277 in the search box to learn more about \"Mammogram: About This Test.\" Current as of: May 12, 2017 Content Version: 11.7 © 6207-4245 [employer]. Care instructions adapted under license by [employer] (which disclaims liability or warranty for this information). If you have questions about a medical condition or this instruction, always ask your healthcare professional. [address] any warranty or liability for your use of this information. Learning About Breast Cancer Screening What is breast cancer screening? Breast cancer occurs when cells that are not normal grow in one or both of your breasts. Screening tests can help find breast cancer early. Cancer is easier to treat when it's found early. Having concerns about breast cancer is common. That's why it's important to talk with your doctor about when to start and how often to get screened for breast cancer. How is breast cancer screening done? Several screening tests can be used to check for breast cancer. · Mammograms check for signs of cancer using X-rays. They can show tumors that are too small for you or your doctor to feel. During a mammogram, a machine squeezes your breasts to make them flatter and easier to X-ray. At least two pictures are taken of each breast. One is taken from the top and one from the side. · 3-D mammograms are also called digital breast tomosynthesis. Your breast is positioned on a flat plate.  A top plate is pressed against your breast to keep it in position. The X-ray arm then moves in an arc above the breast and takes many pictures. A computer uses these X-rays to create a three-dimensional image. · Clinical breast exams are a doctor's exam. Your doctor carefully feels your breasts and under your arms to check for lumps or other changes. After the screening, your doctor will tell you the results. You will also be told if you need any follow-up tests. When should you get screened? Talk with your doctor about when you should start being tested for breast cancer. How often you get tested and the kind of tests you get will depend on your age and your risk. The guidelines that follow are for women who have an average risk for breast cancer. If you have a higher risk for breast cancer, such as having a family history of breast cancer in multiple relatives or at a young age, your doctor may recommend different screening for you. · Ages 21 to 44: Some experts recommend that women have a clinical breast exam every 3 years, starting at age 21. Ask your doctor how often you should have this test. If you have a high risk for breast cancer, talk with your doctor about when to start yearly mammograms and other screening tests. · Ages 36 and older: Talk with your doctor about how often you should have mammograms and clinical breast exams. What is your risk for breast cancer? If you don't already know your risk of breast cancer, you can ask your doctor about it. You can also look it up at www.cancer.gov/bcrisktool/. If your doctor says that you have a high or very high risk, ask about ways to reduce your risk. These could include getting extra screening, taking medicine, or having surgery. If you have a strong family history of breast cancer, ask your doctor about genetic testing. What steps can you take to stay healthy?  
Some things that increase your risk of breast cancer, such as your age and being female, cannot be controlled. But you can do some things to stay as healthy as you can. · Learn what your breasts normally look and feel like. If you notice any changes, tell your doctor. · Drink alcohol wisely. Your risk goes up the more you drink. For the best health, women should have no more than 1 drink a day or 7 drinks a week. · If you smoke, quit. When you quit smoking, you lower your chances of getting many types of cancer. You can also do your best to eat well, be active, and stay at a healthy weight. Eating healthy foods and being active every day, as well as staying at a healthy weight, may help prevent cancer. Where can you learn more? Go to http://justyn-ben.info/. Enter Q822 in the search box to learn more about \"Learning About Breast Cancer Screening. \" Current as of: May 12, 2017 Content Version: 11.7 © 3766-3518 [employer]. Care instructions adapted under license by [employer] (which disclaims liability or warranty for this information). If you have questions about a medical condition or this instruction, always ask your healthcare professional. [address] [address] any warranty or liability for your use of this information. Introducing [facility]! Jenn Hartley introduces [employer] patient portal. Now you can access parts of your medical record, email your doctor's office, and request medication refills online. 1. In your internet browser, go to [email or web address] 2. Click on the First Time User? Click Here link in the Sign In box. You will see the New Member Sign Up page. 3. Enter your Green[employer]ster Access Code exactly as it appears below. You will not need to use this code after youve completed the sign-up process. If you do not sign up before the expiration date, you must request a new code. · Green[employer]ster Access Code: [email or web address] Expires: 12/16/2018  9:24 AM 
 
 4. Enter the last four digits of your Social Security Number (xxxx) and Date of Birth (mm/dd/yyyy) as indicated and click Submit. You will be taken to the next sign-up page. 5. Create a Gifts that Give ID. This will be your Gifts that Give login ID and cannot be changed, so think of one that is secure and easy to remember. 6. Create a Gifts that Give password. You can change your password at any time. 7. Enter your Password Reset Question and Answer. This can be used at a later time if you forget your password. 8. Enter your e-mail address. You will receive e-mail notification when new information is available in [address] 9. Click Sign Up. You can now view and download portions of your medical record. 10. Click the Download Summary menu link to download a portable copy of your medical information. If you have questions, please visit the Frequently Asked Questions section of the Gifts that Give website. Remember, Gifts that Give is NOT to be used for urgent needs. For medical emergencies, dial 911. Now available from your iPhone and Android! Please provide this summary of care documentation to your next provider. Your primary care clinician is listed as Natalie Calderón. If you have any questions after today's visit, please call [phone].

## 2018-09-17 NOTE — PROGRESS NOTES
HISTORY OF PRESENT ILLNESS Clemente Ames is a 60 y.o. female. Patient presents for chronic care follow up. Chief Complaint Patient presents with  Hypertension  Diabetes HPI Pt needs refills. Pt is doing well. She needs refills. She has had her vision checked at the [address] Constitutional: Negative. HENT: Negative. Respiratory: Negative. Cardiovascular: Negative. Genitourinary: Negative. Musculoskeletal: Positive for joint pain (multiple) and myalgias. Skin: Negative. Neurological: Negative. Visit Vitals  /70 (BP 1 Location: Left arm)  Pulse 97  Temp 98.8 °F (37.1 °C) (Oral)  Resp 16  
 Ht 5' 10\" (1.778 m)  Wt 152 lb 9.6 oz (69.2 kg)  LMP 01/20/2007  SpO2 97%  BMI 21.9 kg/m2 Physical Exam  
Constitutional: She is oriented to person, place, and time. She appears well-developed. No distress. HENT:  
Head: Normocephalic and atraumatic. Right Ear: External ear normal.  
Left Ear: External ear normal.  
Nose: Nose normal.  
Mouth/Throat: Oropharynx is clear and moist. No oropharyngeal exudate. Eyes: Conjunctivae and EOM are normal. Pupils are equal, round, and reactive to light. Right eye exhibits no discharge. Left eye exhibits no discharge. Neck: Normal range of motion. Neck supple. Cardiovascular: Normal rate and regular rhythm. Murmur heard. Pulmonary/Chest: Effort normal and breath sounds normal. No respiratory distress. She has no wheezes. She has no rales. Musculoskeletal: Normal range of motion. She exhibits no edema. Neurological: She is alert and oriented to person, place, and time. No cranial nerve deficit. Psychiatric: She has a normal mood and affect. Her behavior is normal. Judgment and thought content normal.  
 
 Diabetic foot exam:  
 
Left Foot: 
 Visual Exam: normal  
 Pulse DP: 2+ (normal) Filament test: normal sensation  Vibratory sensation: normal 
   
 Right Foot: 
 Visual Exam: normal  
 Pulse DP: 2+ (normal) Filament test: normal sensation Vibratory sensation: normal 
 
 
ASSESSMENT and PLAN 
  ICD-10-CM ICD-9-CM 1. Breast cancer screening by mammogram Z12.31 V76.12 [facility] MAMMO BI SCREENING INCL CAD 2. Type 2 diabetes mellitus without complication, without long-term current use of insulin ([facility]) E11.9 250.00 metFORMIN ER (GLUCOPHAGE XR) 500 mg tablet METABOLIC PANEL, COMPREHENSIVE  
   HEMOGLOBIN A1C WITH EAG 3. Rheumatoid arthritis, involving unspecified site, unspecified rheumatoid factor presence ([facility]) M06.9 714.0 meloxicam (MOBIC) 15 mg tablet  
   cyclobenzaprine (FLEXERIL) 10 mg tablet 4. Muscle spasm M62.838 728.85 cyclobenzaprine (FLEXERIL) 10 mg tablet 5. Essential hypertension with goal blood pressure less than 130/80 I10 401.9 LIPID PANEL  
   valsartan-hydroCHLOROthiazide (DIOVAN-HCT) 80-12.5 mg per tablet 6. Type 2 diabetes with nephropathy ([facility]) E11.21 250.40   
  583.81 PLAN: 
I asked Pt if she would get a copy of her eye exam that was done. Pt was given refills. Pt was asked to RTC in 6 months for chronic care. Pt was given after visit summary

## 2018-09-17 NOTE — PATIENT INSTRUCTIONS
Mammogram: About This Test 
What is it? A mammogram is an X-ray of the breast that is used to screen for breast cancer. This test can find tumors that are too small for you or your doctor to feel. Cancer is most easily treated and cured when it is found at an early stage. Why is this test done? A mammogram is done to: 
· Look for breast cancer in women who don't have symptoms. · Find breast cancer in women who have symptoms. Symptoms of breast cancer may include a lump or thickening in the breast, nipple discharge, or dimpling of the skin on one area of the breast. 
· Find an area of suspicious breast tissue to remove for an exam under a microscope (biopsy). How can you prepare for the test? 
· Tell your doctor if you: ¨ Are or might be pregnant. ¨ Are breastfeeding. ¨ Have breast implants. ¨ Have previously had a breast biopsy. · On the day of the test, don't use any deodorant, perfume, powders, or ointments. What happens before the test? 
· You will need to take off any jewelry that might interfere with the X-ray pictures. · You will need to take off your clothes above the waist. 
· You will be given a cloth or paper gown to use during the test. 
What happens during the test? 
· You usually stand during a mammogram. 
· One at a time, your breasts will be placed on a flat plate that contains the X-ray film. · Another plate is then pressed firmly against your breast to help flatten out the breast tissue. You may be asked to lift your arm. · For a few seconds while the X-ray picture is being taken, you will need to hold your breath. · At least two pictures are taken of each breast. One is taken from the top and one from the side. What else should you know about the test? 
· The X-ray plate will feel cold when you place your breast on it. Having your breasts flattened and squeezed isn't comfortable. But it is necessary to flatten out the breast tissue to get the best pictures. · Mammograms do not prevent breast cancer or reduce a woman's risk of developing cancer. · Most things that are found during a mammogram are not breast cancer. How long does the test take? · The test will take about 10 to 15 minutes. You may be in the clinic for up to an hour. What happens after the test? 
· You will probably be able to go home right away. · You can go back to your usual activities right away. Follow-up care is a key part of your treatment and safety. Be sure to make and go to all appointments, and call your doctor if you are having problems. It's also a good idea to keep a list of the medicines you take. Ask your doctor when you can expect to have your test results. Where can you learn more? Go to http://justyn-ben.info/. Enter G681 in the search box to learn more about \"Mammogram: About This Test.\" Current as of: May 12, 2017 Content Version: 11.7 © 5708-7846 [employer]. Care instructions adapted under license by [employer] (which disclaims liability or warranty for this information). If you have questions about a medical condition or this instruction, always ask your healthcare professional. [address] any warranty or liability for your use of this information. Learning About Breast Cancer Screening What is breast cancer screening? Breast cancer occurs when cells that are not normal grow in one or both of your breasts. Screening tests can help find breast cancer early. Cancer is easier to treat when it's found early. Having concerns about breast cancer is common. That's why it's important to talk with your doctor about when to start and how often to get screened for breast cancer. How is breast cancer screening done? Several screening tests can be used to check for breast cancer. · Mammograms check for signs of cancer using X-rays.  They can show tumors that are too small for you or your doctor to feel. During a mammogram, a machine squeezes your breasts to make them flatter and easier to X-ray. At least two pictures are taken of each breast. One is taken from the top and one from the side. · 3-D mammograms are also called digital breast tomosynthesis. Your breast is positioned on a flat plate. A top plate is pressed against your breast to keep it in position. The X-ray arm then moves in an arc above the breast and takes many pictures. A computer uses these X-rays to create a three-dimensional image. · Clinical breast exams are a doctor's exam. Your doctor carefully feels your breasts and under your arms to check for lumps or other changes. After the screening, your doctor will tell you the results. You will also be told if you need any follow-up tests. When should you get screened? Talk with your doctor about when you should start being tested for breast cancer. How often you get tested and the kind of tests you get will depend on your age and your risk. The guidelines that follow are for women who have an average risk for breast cancer. If you have a higher risk for breast cancer, such as having a family history of breast cancer in multiple relatives or at a young age, your doctor may recommend different screening for you. · Ages 21 to 44: Some experts recommend that women have a clinical breast exam every 3 years, starting at age 21. Ask your doctor how often you should have this test. If you have a high risk for breast cancer, talk with your doctor about when to start yearly mammograms and other screening tests. · Ages 36 and older: Talk with your doctor about how often you should have mammograms and clinical breast exams. What is your risk for breast cancer? If you don't already know your risk of breast cancer, you can ask your doctor about it. You can also look it up at www.cancer.gov/bcrisktool/. If your doctor says that you have a high or very high risk, ask about ways to reduce your risk. These could include getting extra screening, taking medicine, or having surgery. If you have a strong family history of breast cancer, ask your doctor about genetic testing. What steps can you take to stay healthy? Some things that increase your risk of breast cancer, such as your age and being female, cannot be controlled. But you can do some things to stay as healthy as you can. · Learn what your breasts normally look and feel like. If you notice any changes, tell your doctor. · Drink alcohol wisely. Your risk goes up the more you drink. For the best health, women should have no more than 1 drink a day or 7 drinks a week. · If you smoke, quit. When you quit smoking, you lower your chances of getting many types of cancer. You can also do your best to eat well, be active, and stay at a healthy weight. Eating healthy foods and being active every day, as well as staying at a healthy weight, may help prevent cancer. Where can you learn more? Go to http://justyn-ben.info/. Enter I640 in the search box to learn more about \"Learning About Breast Cancer Screening. \" Current as of: May 12, 2017 Content Version: 11.7 © 1543-3000 [employer]. Care instructions adapted under license by [employer] (which disclaims liability or warranty for this information). If you have questions about a medical condition or this instruction, always ask your healthcare professional. [place] [address] any warranty or liability for your use of this information.

## 2018-09-17 NOTE — PROGRESS NOTES
Pt is here for 6 month f/u HTN, Diabetes 1. Have you been to the ER, urgent care clinic since your last visit? Hospitalized since your last visit? No 
 
2. Have you seen or consulted any other health care providers outside of the [address] [place] [address] since your last visit? Include any pap smears or colon screening. No  
 
Pt goes to the [facility] for eye exams. Pt due for mammogram. Placed orders

## 2018-09-17 NOTE — PROGRESS NOTES
HISTORY OF PRESENT ILLNESS Laura Mcdermott is a 60 y.o. female. Patient presents for chronic care. HPI 
 
ROS Visit Vitals  /70 (BP 1 Location: Left arm)  Pulse 97  Temp 98.8 °F (37.1 °C) (Oral)  Resp 16  
 Ht 5' 10\" (1.778 m)  Wt 152 lb 9.6 oz (69.2 kg)  LMP 01/20/2007  SpO2 97%  BMI 21.9 kg/m2 Physical Exam 
 
ASSESSMENT and PLAN 
  ICD-10-CM ICD-9-CM 1. Breast cancer screening by mammogram Z12.31 V76.12 [facility] MAMMO BI SCREENING INCL CAD 2. Type 2 diabetes mellitus without complication, without long-term current use of insulin (HCC) E11.9 250.00 metFORMIN ER (GLUCOPHAGE XR) 500 mg tablet METABOLIC PANEL, COMPREHENSIVE  
   HEMOGLOBIN A1C WITH EAG 3. Rheumatoid arthritis, involving unspecified site, unspecified rheumatoid factor presence (HCC) M06.9 714.0 meloxicam (MOBIC) 15 mg tablet  
   cyclobenzaprine (FLEXERIL) 10 mg tablet 4. Muscle spasm M62.838 728.85 cyclobenzaprine (FLEXERIL) 10 mg tablet 5. Essential hypertension with goal blood pressure less than 130/80 I10 401.9 LIPID PANEL  
   valsartan-hydroCHLOROthiazide (DIOVAN-HCT) 80-12.5 mg per tablet 6.  Type 2 diabetes with nephropathy (HCC) E11.21 250.40   
  583.81

## 2018-09-23 NOTE — PROGRESS NOTES
Please advise Pt that her glucose is 71. Her kidney and liver functions are fine  Her cholesterol numbers are very good.

## 2018-10-22 DIAGNOSIS — G47.00 INSOMNIA, UNSPECIFIED TYPE: ICD-10-CM

## 2018-10-22 RX ORDER — ZOLPIDEM TARTRATE 6.25 MG/1
TABLET, FILM COATED, EXTENDED RELEASE ORAL
Qty: 90 TAB | Refills: 0 | Status: SHIPPED | OUTPATIENT
Start: 2018-10-22 | End: 2019-03-07 | Stop reason: SDUPTHER

## 2018-12-31 ENCOUNTER — TELEPHONE (OUTPATIENT)
Dept: FAMILY MEDICINE CLINIC | Age: 63
End: 2018-12-31

## 2018-12-31 NOTE — TELEPHONE ENCOUNTER
Kristin Agrawal from [facility] is calling to have another rx written in place on the [place] b/c the [employer] will not cover it b/c it is not a formulary drug. She said she does not think a prior auth would work , she said it might be best to write something else. Please advise.        [phone]

## 2019-01-02 NOTE — TELEPHONE ENCOUNTER
ANTHONY POTTER (Key: [id number]) - [id number]  Ambien CR 6.25MG OR TBCR  Status: PA Response - Approved    Pharmacy contacted and made aware of status.

## 2019-01-11 DIAGNOSIS — M62.838 MUSCLE SPASM: ICD-10-CM

## 2019-01-11 DIAGNOSIS — M06.9 RHEUMATOID ARTHRITIS, INVOLVING UNSPECIFIED SITE, UNSPECIFIED RHEUMATOID FACTOR PRESENCE: ICD-10-CM

## 2019-01-12 RX ORDER — CYCLOBENZAPRINE HCL 10 MG
TABLET ORAL
Qty: 90 TAB | Refills: 0 | Status: SHIPPED | OUTPATIENT
Start: 2019-01-12 | End: 2019-03-18 | Stop reason: ALTCHOICE

## 2019-03-07 DIAGNOSIS — G47.00 INSOMNIA, UNSPECIFIED TYPE: ICD-10-CM

## 2019-03-07 DIAGNOSIS — M62.838 MUSCLE SPASM: ICD-10-CM

## 2019-03-07 DIAGNOSIS — M06.9 RHEUMATOID ARTHRITIS, INVOLVING UNSPECIFIED SITE, UNSPECIFIED RHEUMATOID FACTOR PRESENCE: ICD-10-CM

## 2019-03-10 DIAGNOSIS — E11.9 TYPE 2 DIABETES MELLITUS WITHOUT COMPLICATION, WITHOUT LONG-TERM CURRENT USE OF INSULIN (HCC): ICD-10-CM

## 2019-03-10 RX ORDER — METFORMIN HYDROCHLORIDE 500 MG/1
TABLET, EXTENDED RELEASE ORAL
Qty: 60 TAB | Refills: 0 | Status: SHIPPED | OUTPATIENT
Start: 2019-03-10 | End: 2019-03-18 | Stop reason: SDUPTHER

## 2019-03-11 RX ORDER — CYCLOBENZAPRINE HCL 10 MG
TABLET ORAL
Qty: 90 TAB | Refills: 0 | OUTPATIENT
Start: 2019-03-11

## 2019-03-11 RX ORDER — ZOLPIDEM TARTRATE 6.25 MG/1
TABLET, FILM COATED, EXTENDED RELEASE ORAL
Qty: 90 TAB | Refills: 0 | Status: SHIPPED | OUTPATIENT
Start: 2019-03-11 | End: 2019-03-18 | Stop reason: SDUPTHER

## 2019-03-18 ENCOUNTER — OFFICE VISIT (OUTPATIENT)
Dept: FAMILY MEDICINE CLINIC | Age: 64
End: 2019-03-18

## 2019-03-18 VITALS
DIASTOLIC BLOOD PRESSURE: 80 MMHG | HEIGHT: 70 IN | RESPIRATION RATE: 16 BRPM | OXYGEN SATURATION: 98 % | HEART RATE: 100 BPM | TEMPERATURE: 98.3 F | BODY MASS INDEX: 22.19 KG/M2 | WEIGHT: 155 LBS | SYSTOLIC BLOOD PRESSURE: 125 MMHG

## 2019-03-18 DIAGNOSIS — Z23 ENCOUNTER FOR IMMUNIZATION: ICD-10-CM

## 2019-03-18 DIAGNOSIS — F41.9 ANXIETY: ICD-10-CM

## 2019-03-18 DIAGNOSIS — I10 ESSENTIAL HYPERTENSION WITH GOAL BLOOD PRESSURE LESS THAN 130/80: ICD-10-CM

## 2019-03-18 DIAGNOSIS — J30.1 ACUTE SEASONAL ALLERGIC RHINITIS DUE TO POLLEN: Primary | ICD-10-CM

## 2019-03-18 DIAGNOSIS — M62.838 MUSCLE SPASM: ICD-10-CM

## 2019-03-18 DIAGNOSIS — I10 ESSENTIAL HYPERTENSION: ICD-10-CM

## 2019-03-18 DIAGNOSIS — G47.00 INSOMNIA, UNSPECIFIED TYPE: ICD-10-CM

## 2019-03-18 DIAGNOSIS — E11.9 TYPE 2 DIABETES MELLITUS WITHOUT COMPLICATION, WITHOUT LONG-TERM CURRENT USE OF INSULIN (HCC): ICD-10-CM

## 2019-03-18 RX ORDER — METFORMIN HYDROCHLORIDE 500 MG/1
500 TABLET, EXTENDED RELEASE ORAL 2 TIMES DAILY
Qty: 180 TAB | Refills: 0 | Status: SHIPPED | OUTPATIENT
Start: 2019-03-18 | End: 2019-06-05 | Stop reason: SDUPTHER

## 2019-03-18 RX ORDER — ZOLPIDEM TARTRATE 6.25 MG/1
6.25 TABLET, FILM COATED, EXTENDED RELEASE ORAL
Qty: 90 TAB | Refills: 0 | Status: SHIPPED | OUTPATIENT
Start: 2019-03-18 | End: 2019-06-05 | Stop reason: SDUPTHER

## 2019-03-18 RX ORDER — ORPHENADRINE CITRATE 100 MG/1
100 TABLET, EXTENDED RELEASE ORAL
Qty: 60 TAB | Refills: 0 | Status: SHIPPED | OUTPATIENT
Start: 2019-03-18 | End: 2019-10-14 | Stop reason: SDUPTHER

## 2019-03-18 RX ORDER — VALSARTAN AND HYDROCHLOROTHIAZIDE 80; 12.5 MG/1; MG/1
TABLET, FILM COATED ORAL
Qty: 90 TAB | Refills: 1 | Status: SHIPPED | OUTPATIENT
Start: 2019-03-18 | End: 2019-10-14 | Stop reason: SDUPTHER

## 2019-03-18 RX ORDER — PAROXETINE HYDROCHLORIDE 20 MG/1
20 TABLET, FILM COATED ORAL DAILY
Qty: 90 TAB | Refills: 1 | Status: SHIPPED | OUTPATIENT
Start: 2019-03-18 | End: 2020-01-03 | Stop reason: SDUPTHER

## 2019-03-18 NOTE — PROGRESS NOTES
Chief Complaint   Patient presents with    Follow-up     Ed visit pneumonia     1. Have you been to the ER, urgent care clinic since your last visit? Hospitalized since your last visit? No    2. Have you seen or consulted any other health care providers outside of the [address] [place] [address] since your last visit? Include any pap smears or colon screening.  No

## 2019-03-18 NOTE — PROGRESS NOTES
HISTORY OF PRESENT ILLNESS  Joshua Liu is a 58 y.o. female. Patient presents for follow up ED visit. HPI  Pt was seen in ED on 3-11-19 for cough, congestion and fever  She was given Doxycycline for pneumonia. She feels tired. But feeling better. Pt states she needs refill.s      Review of Systems   Constitutional: Positive for malaise/fatigue. Negative for chills and fever. HENT: Negative. Respiratory: Positive for cough (improved). Negative for sputum production, shortness of breath and wheezing. Cardiovascular: Negative. Psychiatric/Behavioral: The patient is nervous/anxious and has insomnia. Visit Vitals  /80 (BP 1 Location: Left arm, BP Patient Position: Sitting)   Pulse 100   Temp 98.3 °F (36.8 °C) (Oral)   Resp 16   Ht 5' 10\" (1.778 m)   Wt 155 lb (70.3 kg)   LMP 01/20/2007   SpO2 98%   BMI 22.24 kg/m²       Physical Exam   Constitutional: She appears well-developed. No distress. Cardiovascular: Normal rate, regular rhythm and normal heart sounds. No murmur heard. Pulmonary/Chest: Effort normal and breath sounds normal. No respiratory distress. She has no wheezes. She has no rales. ASSESSMENT and PLAN    ICD-10-CM ICD-9-CM    1. Acute seasonal allergic rhinitis due to pollen J30.1 477.0    2. Insomnia, unspecified type G47.00 780.52 zolpidem CR (AMBIEN CR) 6.25 mg tablet   3. Essential hypertension with goal blood pressure less than 130/80 I10 401.9 valsartan-hydroCHLOROthiazide (DIOVAN-HCT) 80-12.5 mg per tablet   4. Type 2 diabetes mellitus without complication, without long-term current use of insulin (HCC) E11.9 250.00 metFORMIN ER (GLUCOPHAGE XR) 500 mg tablet      MICROALBUMIN, UR, RAND W/ MICROALB/CREAT RATIO      HEMOGLOBIN A1C WITH EAG   5. Essential hypertension I10 401.9    6. Muscle spasm M62.838 728.85 orphenadrine citrate (NORFLEX) 100 mg sr tablet   7.  Anxiety F41.9 300.00 PARoxetine (PAXIL) 20 mg tablet      PLAN:  I reviewed ED notes and imaging studies from [facility]    Pt was advised that she was not to take the Flexeril tid but only as needed but we changed it to Norflex because the Flexeril was making her tired. She was also advised not to take this but only when she needs it. I have discussed the diagnosis with the patient and the intended plan as seen in the above orders. The patient has received an after-visit summary and questions were answered concerning future plans. I have discussed medication side effects and warnings with the patient as well. Patient will call for further questions. Follow-up Disposition:  Return in about 6 months (around 9/18/2019) for chronic care.

## 2019-04-01 ENCOUNTER — HOSPITAL ENCOUNTER (OUTPATIENT)
Dept: MAMMOGRAPHY | Age: 64
Discharge: HOME | End: 2019-04-01
Attending: NURSE PRACTITIONER
Payer: OTHER GOVERNMENT

## 2019-04-01 DIAGNOSIS — Z12.31 BREAST CANCER SCREENING BY MAMMOGRAM: ICD-10-CM

## 2019-04-01 PROCEDURE — 77067 SCR MAMMO BI INCL CAD: CPT

## 2019-04-12 RX ORDER — FLUTICASONE PROPIONATE 50 MCG
SPRAY, SUSPENSION (ML) NASAL
Qty: 48 G | Refills: 0 | Status: SHIPPED | OUTPATIENT
Start: 2019-04-12 | End: 2019-06-05 | Stop reason: SDUPTHER

## 2019-04-12 NOTE — TELEPHONE ENCOUNTER
Requested Prescriptions     Pending Prescriptions Disp Refills    fluticasone propionate (FLONASE) 50 mcg/actuation nasal spray 48 g 0     Sig: USE 2 SPRAYS IN EACH NOSTRIL ONCE DAILY IF NEEDED FOR RUNNY NOSE OR ALLERGIES

## 2019-06-05 DIAGNOSIS — G47.00 INSOMNIA, UNSPECIFIED TYPE: ICD-10-CM

## 2019-06-05 DIAGNOSIS — M06.9 RHEUMATOID ARTHRITIS, INVOLVING UNSPECIFIED SITE, UNSPECIFIED RHEUMATOID FACTOR PRESENCE: ICD-10-CM

## 2019-06-05 DIAGNOSIS — E11.9 TYPE 2 DIABETES MELLITUS WITHOUT COMPLICATION, WITHOUT LONG-TERM CURRENT USE OF INSULIN (HCC): ICD-10-CM

## 2019-06-06 RX ORDER — MELOXICAM 15 MG/1
TABLET ORAL
Qty: 90 TAB | Refills: 0 | Status: SHIPPED | OUTPATIENT
Start: 2019-06-06 | End: 2019-09-04 | Stop reason: SDUPTHER

## 2019-06-06 RX ORDER — METFORMIN HYDROCHLORIDE 500 MG/1
500 TABLET, EXTENDED RELEASE ORAL 2 TIMES DAILY
Qty: 180 TAB | Refills: 0 | Status: SHIPPED | OUTPATIENT
Start: 2019-06-06 | End: 2019-10-14 | Stop reason: SDUPTHER

## 2019-06-06 RX ORDER — ZOLPIDEM TARTRATE 6.25 MG/1
6.25 TABLET, FILM COATED, EXTENDED RELEASE ORAL
Qty: 90 TAB | Refills: 0 | Status: SHIPPED | OUTPATIENT
Start: 2019-06-06 | End: 2020-01-03 | Stop reason: SDUPTHER

## 2019-06-06 RX ORDER — FLUTICASONE PROPIONATE 50 MCG
SPRAY, SUSPENSION (ML) NASAL
Qty: 48 G | Refills: 0 | Status: SHIPPED | OUTPATIENT
Start: 2019-06-06 | End: 2020-05-15

## 2019-06-07 ENCOUNTER — TELEPHONE (OUTPATIENT)
Dept: FAMILY MEDICINE CLINIC | Age: 64
End: 2019-06-07

## 2019-06-10 DIAGNOSIS — M06.9 RHEUMATOID ARTHRITIS, INVOLVING UNSPECIFIED SITE, UNSPECIFIED RHEUMATOID FACTOR PRESENCE: ICD-10-CM

## 2019-06-10 DIAGNOSIS — G47.00 INSOMNIA, UNSPECIFIED TYPE: ICD-10-CM

## 2019-06-10 DIAGNOSIS — E11.9 TYPE 2 DIABETES MELLITUS WITHOUT COMPLICATION, WITHOUT LONG-TERM CURRENT USE OF INSULIN (HCC): ICD-10-CM

## 2019-06-10 RX ORDER — ZOLPIDEM TARTRATE 6.25 MG/1
6.25 TABLET, FILM COATED, EXTENDED RELEASE ORAL
Qty: 90 TAB | Refills: 0 | OUTPATIENT
Start: 2019-06-10

## 2019-06-10 RX ORDER — MELOXICAM 15 MG/1
TABLET ORAL
Qty: 90 TAB | Refills: 0 | OUTPATIENT
Start: 2019-06-10

## 2019-06-10 RX ORDER — METFORMIN HYDROCHLORIDE 500 MG/1
500 TABLET, EXTENDED RELEASE ORAL 2 TIMES DAILY
Qty: 180 TAB | Refills: 0 | OUTPATIENT
Start: 2019-06-10

## 2019-06-10 RX ORDER — FLUTICASONE PROPIONATE 50 MCG
SPRAY, SUSPENSION (ML) NASAL
Qty: 48 G | Refills: 0 | OUTPATIENT
Start: 2019-06-10

## 2019-06-30 DIAGNOSIS — G47.00 INSOMNIA, UNSPECIFIED TYPE: ICD-10-CM

## 2019-07-03 RX ORDER — ZOLPIDEM TARTRATE 6.25 MG/1
TABLET, FILM COATED, EXTENDED RELEASE ORAL
Qty: 90 TAB | Refills: 0 | OUTPATIENT
Start: 2019-07-03

## 2019-09-04 DIAGNOSIS — M06.9 RHEUMATOID ARTHRITIS, INVOLVING UNSPECIFIED SITE, UNSPECIFIED RHEUMATOID FACTOR PRESENCE: ICD-10-CM

## 2019-09-05 RX ORDER — MELOXICAM 15 MG/1
TABLET ORAL
Qty: 90 TAB | Refills: 0 | Status: SHIPPED | OUTPATIENT
Start: 2019-09-05 | End: 2020-01-03 | Stop reason: SDUPTHER

## 2019-09-13 DIAGNOSIS — I10 ESSENTIAL HYPERTENSION WITH GOAL BLOOD PRESSURE LESS THAN 130/80: ICD-10-CM

## 2019-09-13 RX ORDER — VALSARTAN AND HYDROCHLOROTHIAZIDE 80; 12.5 MG/1; MG/1
TABLET, FILM COATED ORAL
Qty: 90 TAB | Refills: 1 | OUTPATIENT
Start: 2019-09-13

## 2019-09-20 NOTE — TELEPHONE ENCOUNTER
Left non detailed message requesting a return call to the office.  appt needed per [employer]

## 2019-10-14 DIAGNOSIS — M62.838 MUSCLE SPASM: ICD-10-CM

## 2019-10-14 DIAGNOSIS — E11.9 TYPE 2 DIABETES MELLITUS WITHOUT COMPLICATION, WITHOUT LONG-TERM CURRENT USE OF INSULIN (HCC): ICD-10-CM

## 2019-10-14 DIAGNOSIS — G47.00 INSOMNIA, UNSPECIFIED TYPE: ICD-10-CM

## 2019-10-14 DIAGNOSIS — I10 ESSENTIAL HYPERTENSION WITH GOAL BLOOD PRESSURE LESS THAN 130/80: ICD-10-CM

## 2019-10-14 RX ORDER — VALSARTAN AND HYDROCHLOROTHIAZIDE 80; 12.5 MG/1; MG/1
TABLET, FILM COATED ORAL
Qty: 30 TAB | Refills: 0 | Status: SHIPPED | OUTPATIENT
Start: 2019-10-14 | End: 2019-11-25 | Stop reason: SDUPTHER

## 2019-10-14 RX ORDER — METFORMIN HYDROCHLORIDE 500 MG/1
500 TABLET, EXTENDED RELEASE ORAL 2 TIMES DAILY
Qty: 60 TAB | Refills: 0 | Status: SHIPPED | OUTPATIENT
Start: 2019-10-14 | End: 2020-01-03 | Stop reason: SDUPTHER

## 2019-10-14 NOTE — TELEPHONE ENCOUNTER
Requested Prescriptions     Pending Prescriptions Disp Refills    orphenadrine citrate (NORFLEX) 100 mg sr tablet [Pharmacy Med Name: ORPHENADRINE  MG TABLET] 60 Tab 0     Sig: take 1 tablet by mouth twice a day if needed    valsartan-hydroCHLOROthiazide (DIOVAN-HCT) 80-12.5 mg per tablet [Pharmacy Med Name: VALSARTAN-HCTZ 80-12.5 MG TAB] 90 Tab 1     Sig: take 1 tablet by mouth once daily    metFORMIN ER (GLUCOPHAGE XR) 500 mg tablet 180 Tab 0     Sig: Take 1 Tab by mouth two (2) times a day.

## 2019-10-16 RX ORDER — ORPHENADRINE CITRATE 100 MG/1
TABLET, EXTENDED RELEASE ORAL
Qty: 60 TAB | Refills: 0 | Status: SHIPPED | OUTPATIENT
Start: 2019-10-16 | End: 2019-12-18 | Stop reason: ALTCHOICE

## 2019-10-21 RX ORDER — ZOLPIDEM TARTRATE 6.25 MG/1
TABLET, FILM COATED, EXTENDED RELEASE ORAL
Qty: 30 TAB | Refills: 2 | OUTPATIENT
Start: 2019-10-21

## 2019-11-22 DIAGNOSIS — I10 ESSENTIAL HYPERTENSION WITH GOAL BLOOD PRESSURE LESS THAN 130/80: ICD-10-CM

## 2019-11-24 RX ORDER — VALSARTAN AND HYDROCHLOROTHIAZIDE 80; 12.5 MG/1; MG/1
TABLET, FILM COATED ORAL
Qty: 90 TAB | Refills: 1 | OUTPATIENT
Start: 2019-11-24

## 2019-11-25 RX ORDER — VALSARTAN AND HYDROCHLOROTHIAZIDE 80; 12.5 MG/1; MG/1
TABLET, FILM COATED ORAL
Qty: 60 TAB | Refills: 0 | Status: SHIPPED | OUTPATIENT
Start: 2019-11-25 | End: 2020-01-03 | Stop reason: SDUPTHER

## 2019-11-25 NOTE — TELEPHONE ENCOUNTER
Pt has appointment scheduled 1/3/2020    Medication refilled per verbral read back order Dalton Bhatt NP.

## 2019-12-18 ENCOUNTER — HOSPITAL ENCOUNTER (EMERGENCY)
Age: 64
Discharge: HOME | End: 2019-12-18
Attending: EMERGENCY MEDICINE | Admitting: EMERGENCY MEDICINE
Payer: MEDICARE

## 2019-12-18 VITALS
TEMPERATURE: 99.1 F | HEART RATE: 90 BPM | OXYGEN SATURATION: 100 % | DIASTOLIC BLOOD PRESSURE: 86 MMHG | SYSTOLIC BLOOD PRESSURE: 126 MMHG | RESPIRATION RATE: 16 BRPM

## 2019-12-18 DIAGNOSIS — S16.1XXA STRAIN OF NECK MUSCLE, INITIAL ENCOUNTER: Primary | ICD-10-CM

## 2019-12-18 PROCEDURE — 99282 EMERGENCY DEPT VISIT SF MDM: CPT

## 2019-12-18 RX ORDER — LIDOCAINE 50 MG/G
PATCH TOPICAL
Qty: 15 EACH | Refills: 0 | Status: SHIPPED | OUTPATIENT
Start: 2019-12-18 | End: 2021-01-28 | Stop reason: ALTCHOICE

## 2019-12-18 RX ORDER — CYCLOBENZAPRINE HCL 10 MG
10 TABLET ORAL
Qty: 15 TAB | Refills: 0 | Status: SHIPPED | OUTPATIENT
Start: 2019-12-18 | End: 2020-01-03 | Stop reason: SDUPTHER

## 2019-12-18 NOTE — ED NOTES
Larua Randolph is a 71 y.o. female that was discharged in stable condition. The patients diagnosis, condition and treatment were explained to  patient and aftercare instructions were given. The patient verbalized understanding. Patient armband removed and shredded.

## 2019-12-18 NOTE — ED PROVIDER NOTES
EMERGENCY DEPARTMENT HISTORY AND PHYSICAL EXAM    2:44 PM      Date: 12/18/2019  Patient Name: Carl Mendoza    History of Presenting Illness     Chief Complaint   Patient presents with    Neck Pain         History Provided By: Patient    Additional History (Context): Carl Mendoza is a 71 y.o. female with hypertension who presents with complaints of neck stiffness since 12/15/19. Denies any fevers or headaches at home. States that she thinks she slept on it wrong. Denies any injury or trauma. States she has been using Salonpas and IcyHot at home with relief. PCP: Amrita Soto MD    Current Outpatient Medications   Medication Sig Dispense Refill    cyclobenzaprine (FLEXERIL) 10 mg tablet Take 1 Tab by mouth three (3) times daily as needed for Muscle Spasm(s). 15 Tab 0    lidocaine (LIDODERM) 5 % Apply patch to the affected area for 12 hours a day and remove for 12 hours a day. 15 Each 0    valsartan-hydroCHLOROthiazide (DIOVAN-HCT) 80-12.5 mg per tablet take 1 tablet by mouth once daily 60 Tab 0    metFORMIN ER (GLUCOPHAGE XR) 500 mg tablet Take 1 Tab by mouth two (2) times a day. 60 Tab 0    meloxicam (MOBIC) 15 mg tablet Take with food daily 90 Tab 0    fluticasone propionate (FLONASE) 50 mcg/actuation nasal spray USE 2 SPRAYS IN EACH NOSTRIL ONCE DAILY IF NEEDED FOR RUNNY NOSE OR ALLERGIES 48 g 0    zolpidem CR (AMBIEN CR) 6.25 mg tablet Take 1 Tab by mouth nightly as needed for Sleep. Max Daily Amount: 6.25 mg. 90 Tab 0    PARoxetine (PAXIL) 20 mg tablet Take 1 Tab by mouth daily. 90 Tab 1    montelukast (SINGULAIR) 10 mg tablet Take 1 Tab by mouth daily. 90 Tab 1    Blood-Glucose Meter monitoring kit Freestyle meter. Check fasting glucose daily.  1 Kit 0    Lancets misc Check glucose 2 times daily 200 Each 11    glucose blood VI test strips (BLOOD GLUCOSE TEST) strip To use with freestyle meter 100 Strip 11    diclofenac (VOLTAREN) 1 % gel Apply 2 g to affected area four (4) times daily. 2 g 11    Lancing Device with Lancets kit To use with freestyle meter 1 Kit 0    aspirin 81 mg chewable tablet Take 81 mg by mouth daily.  FEXOFENADINE HCL (ALLEGRA PO) Take 180 mg by mouth daily as needed. Past History     Past Medical History:  Past Medical History:   Diagnosis Date    Arthritis     Arthritis 2011    Heart murmur 2011    Heart murmur     History of echocardiogram 2014    EF 55-60%. No WMA.  History of seasonal allergies 2011    HTN (hypertension) 2011    Hypertension     Iron deficiency anemia 2012    Normal myocardial perfusion study 2014    No ischemia or prior infarction. Hyperdynamic EF >70%. No RWMA. Neg EKG on EST. Ex time 6 min 11 sec.  Seasonal allergies        Past Surgical History:  Past Surgical History:   Procedure Laterality Date    HX  SECTION  1986    single birth csection   [de-identified]  [address]    a set of twins born by [address]    HX ORTHOPAEDIC  2005    Carpal Tunnel surgery       Family History:  Family History   Problem Relation Age of Onset    High Cholesterol Mother     Hypertension Father     Diabetes Father        Social History:  Social History     Tobacco Use    Smoking status: Never Smoker    Smokeless tobacco: Never Used   Substance Use Topics    Alcohol use: No    Drug use: No       Allergies: Allergies   Allergen Reactions    Grass Pollen Runny Nose and Sneezing         Review of Systems       Review of Systems   Constitutional: Negative for chills, diaphoresis, fatigue and fever. HENT: Negative for congestion, sinus pressure, sinus pain, sore throat and trouble swallowing. Respiratory: Negative for cough, chest tightness, shortness of breath and wheezing. Cardiovascular: Negative for chest pain. Gastrointestinal: Negative for abdominal pain. Musculoskeletal: Positive for myalgias, neck pain and neck stiffness.    Neurological: Negative for dizziness, syncope, weakness, light-headedness, numbness and headaches. All other systems reviewed and are negative. Physical Exam     Visit Vitals  /86 (BP 1 Location: Left arm, BP Patient Position: At rest)   Pulse 90   Temp 99.1 °F (37.3 °C)   Resp 16   LMP 01/20/2007   SpO2 100%         Physical Exam  Vitals signs reviewed. Constitutional:       General: She is not in acute distress. Appearance: Normal appearance. She is normal weight. She is not ill-appearing, toxic-appearing or diaphoretic. HENT:      Head: Normocephalic and atraumatic. Right Ear: Tympanic membrane and ear canal normal.      Left Ear: Tympanic membrane and ear canal normal.      Nose: Nose normal.      Mouth/Throat:      Mouth: Mucous membranes are moist.      Pharynx: Oropharynx is clear. Eyes:      Extraocular Movements: Extraocular movements intact. Pupils: Pupils are equal, round, and reactive to light. Neck:      Musculoskeletal: Normal range of motion. Muscular tenderness present. No neck rigidity, injury or spinous process tenderness. Meningeal: Brudzinski's sign and Kernig's sign absent. Comments: No nuchal rigidity. Negative Brudzinski. Negative Kernig. Patient has left and right paraspinal cervical muscle tenderness to palpation. Bilateral sternocleidomastoid tenderness to palpation. Active range of motion limited secondary to muscle pain. Full passive range of motion. Cardiovascular:      Rate and Rhythm: Normal rate and regular rhythm. Pulses: Normal pulses. Heart sounds: No murmur. No gallop. Pulmonary:      Effort: Pulmonary effort is normal.      Breath sounds: Normal breath sounds. No stridor. No wheezing, rhonchi or rales. Abdominal:      General: Bowel sounds are normal.      Palpations: Abdomen is soft. Lymphadenopathy:      Cervical: No cervical adenopathy. Skin:     General: Skin is warm and dry.       Capillary Refill: Capillary refill takes less than 2 seconds. Neurological:      General: No focal deficit present. Mental Status: She is alert and oriented to person, place, and time. Cranial Nerves: No cranial nerve deficit. Sensory: No sensory deficit. Motor: No weakness. Diagnostic Study Results     Labs -  No results found for this or any previous visit (from the past 12 hour(s)). Radiologic Studies -   No orders to display         Medical Decision Making   I am the first provider for this patient. I reviewed the vital signs, available nursing notes, past medical history, past surgical history, family history and social history. Vital Signs-Reviewed the patient's vital signs. Records Reviewed: Nursing Notes and Old Medical Records (Time of Review: 2:44 PM)    ED Course: Progress Notes, Reevaluation, and Consults:  2:44 PM Met with patient, reviewed history, performed physical exam. Physical exam suggestive of cervical muscle strain. There is no point tenderness, no midline cervical tenderness, no step off or crepitus noted. No nuchal rigidity and negative Kernig's or Brudzinski's sign. Doubt meningitis. Patient afebrile and denies fevers or headaches at home. Provider Notes (Medical Decision Making):   71year old female seen in the ED for complaints of neck pain x 3 days. Patient denies fevers or headaches. Denies weakness. Physical exam reveals a negative Kernig and negative Brudzinski sign. There is no nuchal rigidity. Patient does have bilateral cervical paraspinal muscle tenderness to palpation, as well as bilateral sternocleidomastoid tenderness to palpation. Patient will be given prescription for Flexeril and lidocaine patches. Patient advised to continue symptomatic treatment with ibuprofen, and heating pad. Patient advised to follow up with PCP in 1-2 days. Patient given strict return precautions. Patient advised to return to the ED immediately if symptoms worsen.      Diagnosis     Clinical Impression:   1. Strain of neck muscle, initial encounter        Disposition: home     Follow-up Information     Follow up With Specialties Details Why Contact Info    Catherine Ramirez MD Family Practice Call in 1 day For follow up regarding ER visit. [address]  [address]  [phone] EMERGENCY DEPT Emergency Medicine  Immediately if symptoms worsen. [place] [id number] [phone]           Patient's Medications   Start Taking    CYCLOBENZAPRINE (FLEXERIL) 10 MG TABLET    Take 1 Tab by mouth three (3) times daily as needed for Muscle Spasm(s). LIDOCAINE (LIDODERM) 5 %    Apply patch to the affected area for 12 hours a day and remove for 12 hours a day. Continue Taking    ASPIRIN 81 MG CHEWABLE TABLET    Take 81 mg by mouth daily. BLOOD-GLUCOSE METER MONITORING KIT    Freestyle meter. Check fasting glucose daily. DICLOFENAC (VOLTAREN) 1 % GEL    Apply 2 g to affected area four (4) times daily. FEXOFENADINE HCL (ALLEGRA PO)    Take 180 mg by mouth daily as needed. FLUTICASONE PROPIONATE (FLONASE) 50 MCG/ACTUATION NASAL SPRAY    USE 2 SPRAYS IN EACH NOSTRIL ONCE DAILY IF NEEDED FOR RUNNY NOSE OR ALLERGIES    GLUCOSE BLOOD VI TEST STRIPS (BLOOD GLUCOSE TEST) STRIP    To use with freestyle meter    LANCETS MISC    Check glucose 2 times daily    LANCING DEVICE WITH LANCETS KIT    To use with freestyle meter    MELOXICAM (MOBIC) 15 MG TABLET    Take with food daily    METFORMIN ER (GLUCOPHAGE XR) 500 MG TABLET    Take 1 Tab by mouth two (2) times a day. MONTELUKAST (SINGULAIR) 10 MG TABLET    Take 1 Tab by mouth daily. PAROXETINE (PAXIL) 20 MG TABLET    Take 1 Tab by mouth daily. VALSARTAN-HYDROCHLOROTHIAZIDE (DIOVAN-HCT) 80-12.5 MG PER TABLET    take 1 tablet by mouth once daily    ZOLPIDEM CR (AMBIEN CR) 6.25 MG TABLET    Take 1 Tab by mouth nightly as needed for Sleep. Max Daily Amount: 6.25 mg.    These Medications have changed    No medications on file   Stop Taking    ORPHENADRINE CITRATE (NORFLEX) 100 MG SR TABLET    take 1 tablet by mouth twice a day if needed     Medardo Yancey PA-C  3:17 PM    Dictation disclaimer:  Please note that this dictation was completed with Netviewer, the computer voice recognition software. Quite often unanticipated grammatical, syntax, homophones, and other interpretive errors are inadvertently transcribed by the computer software. Please disregard these errors. Please excuse any errors that have escaped final proofreading.

## 2020-01-01 DIAGNOSIS — E11.9 TYPE 2 DIABETES MELLITUS WITHOUT COMPLICATION, WITHOUT LONG-TERM CURRENT USE OF INSULIN (HCC): ICD-10-CM

## 2020-01-02 RX ORDER — METFORMIN HYDROCHLORIDE 500 MG/1
TABLET, EXTENDED RELEASE ORAL
Qty: 180 TAB | OUTPATIENT
Start: 2020-01-02

## 2020-01-03 DIAGNOSIS — I10 ESSENTIAL HYPERTENSION WITH GOAL BLOOD PRESSURE LESS THAN 130/80: ICD-10-CM

## 2020-01-03 DIAGNOSIS — F41.9 ANXIETY: ICD-10-CM

## 2020-01-03 DIAGNOSIS — G47.00 INSOMNIA, UNSPECIFIED TYPE: ICD-10-CM

## 2020-01-03 DIAGNOSIS — M06.9 RHEUMATOID ARTHRITIS, INVOLVING UNSPECIFIED SITE, UNSPECIFIED RHEUMATOID FACTOR PRESENCE: ICD-10-CM

## 2020-01-03 DIAGNOSIS — E11.9 TYPE 2 DIABETES MELLITUS WITHOUT COMPLICATION, WITHOUT LONG-TERM CURRENT USE OF INSULIN (HCC): ICD-10-CM

## 2020-01-03 RX ORDER — CYCLOBENZAPRINE HCL 10 MG
10 TABLET ORAL
Qty: 15 TAB | Refills: 0 | Status: SHIPPED | OUTPATIENT
Start: 2020-01-03 | End: 2020-02-03 | Stop reason: SDUPTHER

## 2020-01-03 RX ORDER — ZOLPIDEM TARTRATE 6.25 MG/1
6.25 TABLET, FILM COATED, EXTENDED RELEASE ORAL
Qty: 30 TAB | Refills: 0 | Status: SHIPPED | OUTPATIENT
Start: 2020-01-03 | End: 2020-02-03 | Stop reason: SDUPTHER

## 2020-01-03 RX ORDER — METFORMIN HYDROCHLORIDE 500 MG/1
500 TABLET, EXTENDED RELEASE ORAL 2 TIMES DAILY
Qty: 60 TAB | Refills: 0 | Status: SHIPPED | OUTPATIENT
Start: 2020-01-03 | End: 2020-02-03 | Stop reason: SDUPTHER

## 2020-01-03 RX ORDER — VALSARTAN AND HYDROCHLOROTHIAZIDE 80; 12.5 MG/1; MG/1
TABLET, FILM COATED ORAL
Qty: 30 TAB | Refills: 0 | Status: SHIPPED | OUTPATIENT
Start: 2020-01-03 | End: 2020-01-30 | Stop reason: SDUPTHER

## 2020-01-03 RX ORDER — PAROXETINE HYDROCHLORIDE 20 MG/1
20 TABLET, FILM COATED ORAL DAILY
Qty: 30 TAB | Refills: 0 | Status: SHIPPED | OUTPATIENT
Start: 2020-01-03 | End: 2020-02-03 | Stop reason: SDUPTHER

## 2020-01-03 RX ORDER — MELOXICAM 15 MG/1
TABLET ORAL
Qty: 30 TAB | Refills: 0 | Status: SHIPPED | OUTPATIENT
Start: 2020-01-03 | End: 2020-02-03 | Stop reason: SDUPTHER

## 2020-01-03 NOTE — TELEPHONE ENCOUNTER
Requested Prescriptions     Pending Prescriptions Disp Refills    cyclobenzaprine (FLEXERIL) 10 mg tablet 15 Tab 0     Sig: Take 1 Tab by mouth three (3) times daily as needed for Muscle Spasm(s).  meloxicam (MOBIC) 15 mg tablet 90 Tab 0     Sig: Take with food daily    metFORMIN ER (GLUCOPHAGE XR) 500 mg tablet 60 Tab 0     Sig: Take 1 Tab by mouth two (2) times a day.  PARoxetine (PAXIL) 20 mg tablet 90 Tab 1     Sig: Take 1 Tab by mouth daily.  valsartan-hydroCHLOROthiazide (DIOVAN-HCT) 80-12.5 mg per tablet 60 Tab 0     Sig: take 1 tablet by mouth once daily    zolpidem CR (AMBIEN CR) 6.25 mg tablet 90 Tab 0     Sig: Take 1 Tab by mouth nightly as needed for Sleep. Max Daily Amount: 6.25 mg. Pharmacy is [facility]    Pt made f/u appt for 02/03/2020.  Per pt is is out of all of these medications

## 2020-01-21 DIAGNOSIS — I10 ESSENTIAL HYPERTENSION WITH GOAL BLOOD PRESSURE LESS THAN 130/80: ICD-10-CM

## 2020-01-21 RX ORDER — VALSARTAN AND HYDROCHLOROTHIAZIDE 80; 12.5 MG/1; MG/1
TABLET, FILM COATED ORAL
Qty: 60 TAB | OUTPATIENT
Start: 2020-01-21

## 2020-01-21 RX ORDER — VALSARTAN AND HYDROCHLOROTHIAZIDE 80; 12.5 MG/1; MG/1
TABLET, FILM COATED ORAL
Qty: 30 TAB | Refills: 0 | OUTPATIENT
Start: 2020-01-21

## 2020-01-24 DIAGNOSIS — E11.9 TYPE 2 DIABETES MELLITUS WITHOUT COMPLICATION, WITHOUT LONG-TERM CURRENT USE OF INSULIN (HCC): ICD-10-CM

## 2020-01-24 RX ORDER — METFORMIN HYDROCHLORIDE 500 MG/1
TABLET, EXTENDED RELEASE ORAL
Qty: 60 TAB | Refills: 0 | OUTPATIENT
Start: 2020-01-24

## 2020-01-28 DIAGNOSIS — I10 ESSENTIAL HYPERTENSION WITH GOAL BLOOD PRESSURE LESS THAN 130/80: ICD-10-CM

## 2020-01-28 RX ORDER — VALSARTAN AND HYDROCHLOROTHIAZIDE 80; 12.5 MG/1; MG/1
TABLET, FILM COATED ORAL
Qty: 30 TAB | Refills: 0 | OUTPATIENT
Start: 2020-01-28

## 2020-01-30 RX ORDER — VALSARTAN AND HYDROCHLOROTHIAZIDE 80; 12.5 MG/1; MG/1
TABLET, FILM COATED ORAL
Qty: 15 TAB | Refills: 0 | Status: SHIPPED | OUTPATIENT
Start: 2020-01-30 | End: 2020-02-03 | Stop reason: SDUPTHER

## 2020-01-30 NOTE — TELEPHONE ENCOUNTER
Pt has an appt 2/3/2020    [facility] Pankaj Atwood NP.

## 2020-02-03 ENCOUNTER — HOSPITAL ENCOUNTER (OUTPATIENT)
Dept: LAB | Age: 65
Discharge: HOME | End: 2020-02-03
Payer: COMMERCIAL

## 2020-02-03 ENCOUNTER — TELEPHONE (OUTPATIENT)
Dept: FAMILY MEDICINE CLINIC | Age: 65
End: 2020-02-03

## 2020-02-03 ENCOUNTER — OFFICE VISIT (OUTPATIENT)
Dept: FAMILY MEDICINE CLINIC | Age: 65
End: 2020-02-03

## 2020-02-03 VITALS
DIASTOLIC BLOOD PRESSURE: 77 MMHG | BODY MASS INDEX: 20.47 KG/M2 | RESPIRATION RATE: 18 BRPM | HEIGHT: 70 IN | SYSTOLIC BLOOD PRESSURE: 130 MMHG | TEMPERATURE: 98.1 F | WEIGHT: 143 LBS | HEART RATE: 91 BPM | OXYGEN SATURATION: 97 %

## 2020-02-03 DIAGNOSIS — E11.9 TYPE 2 DIABETES MELLITUS WITHOUT COMPLICATION, WITHOUT LONG-TERM CURRENT USE OF INSULIN (HCC): ICD-10-CM

## 2020-02-03 DIAGNOSIS — M06.9 RHEUMATOID ARTHRITIS, INVOLVING UNSPECIFIED SITE, UNSPECIFIED RHEUMATOID FACTOR PRESENCE: ICD-10-CM

## 2020-02-03 DIAGNOSIS — F41.9 ANXIETY: ICD-10-CM

## 2020-02-03 DIAGNOSIS — I10 ESSENTIAL HYPERTENSION WITH GOAL BLOOD PRESSURE LESS THAN 130/80: ICD-10-CM

## 2020-02-03 DIAGNOSIS — G47.00 INSOMNIA, UNSPECIFIED TYPE: ICD-10-CM

## 2020-02-03 LAB
ALBUMIN SERPL-MCNC: 4 G/DL (ref 3.4–5)
ALBUMIN/GLOB SERPL: 1 {RATIO} (ref 0.8–1.7)
ALP SERPL-CCNC: 65 U/L (ref 45–117)
ALT SERPL-CCNC: 20 U/L (ref 13–56)
ANION GAP SERPL CALC-SCNC: 6 MMOL/L (ref 3–18)
AST SERPL-CCNC: 8 U/L (ref 10–38)
BILIRUB SERPL-MCNC: 0.6 MG/DL (ref 0.2–1)
BUN SERPL-MCNC: 13 MG/DL (ref 7–18)
BUN/CREAT SERPL: 17 (ref 12–20)
CALCIUM SERPL-MCNC: 10.4 MG/DL (ref 8.5–10.1)
CHLORIDE SERPL-SCNC: 104 MMOL/L (ref 100–111)
CHOLEST SERPL-MCNC: 164 MG/DL
CO2 SERPL-SCNC: 31 MMOL/L (ref 21–32)
CREAT SERPL-MCNC: 0.77 MG/DL (ref 0.6–1.3)
CREAT UR-MCNC: 94 MG/DL (ref 30–125)
EST. AVERAGE GLUCOSE BLD GHB EST-MCNC: 128 MG/DL
GLOBULIN SER CALC-MCNC: 4 G/DL (ref 2–4)
GLUCOSE SERPL-MCNC: 81 MG/DL (ref 74–99)
HBA1C MFR BLD: 6.1 % (ref 4.2–5.6)
HDLC SERPL-MCNC: 72 MG/DL (ref 40–60)
HDLC SERPL: 2.3 {RATIO} (ref 0–5)
LDLC SERPL CALC-MCNC: 76 MG/DL (ref 0–100)
LIPID PROFILE,FLP: ABNORMAL
MICROALBUMIN UR-MCNC: 2.42 MG/DL (ref 0–3)
MICROALBUMIN/CREAT UR-RTO: 26 MG/G (ref 0–30)
POTASSIUM SERPL-SCNC: 4.6 MMOL/L (ref 3.5–5.5)
PROT SERPL-MCNC: 8 G/DL (ref 6.4–8.2)
SODIUM SERPL-SCNC: 141 MMOL/L (ref 136–145)
TRIGL SERPL-MCNC: 80 MG/DL (ref ?–150)
VLDLC SERPL CALC-MCNC: 16 MG/DL

## 2020-02-03 PROCEDURE — 83036 HEMOGLOBIN GLYCOSYLATED A1C: CPT

## 2020-02-03 PROCEDURE — 82043 UR ALBUMIN QUANTITATIVE: CPT

## 2020-02-03 PROCEDURE — 80053 COMPREHEN METABOLIC PANEL: CPT

## 2020-02-03 PROCEDURE — 80061 LIPID PANEL: CPT

## 2020-02-03 PROCEDURE — 36415 COLL VENOUS BLD VENIPUNCTURE: CPT

## 2020-02-03 RX ORDER — MELOXICAM 15 MG/1
TABLET ORAL
Qty: 90 TAB | Refills: 2 | Status: SHIPPED | OUTPATIENT
Start: 2020-02-03 | End: 2020-09-03 | Stop reason: SDUPTHER

## 2020-02-03 RX ORDER — ZOLPIDEM TARTRATE 6.25 MG/1
6.25 TABLET, FILM COATED, EXTENDED RELEASE ORAL
Qty: 30 TAB | Refills: 0 | Status: SHIPPED | OUTPATIENT
Start: 2020-02-03 | End: 2020-03-16 | Stop reason: SDUPTHER

## 2020-02-03 RX ORDER — PAROXETINE HYDROCHLORIDE 20 MG/1
20 TABLET, FILM COATED ORAL DAILY
Qty: 90 TAB | Refills: 2 | Status: SHIPPED | OUTPATIENT
Start: 2020-02-03 | End: 2020-10-28 | Stop reason: SDUPTHER

## 2020-02-03 RX ORDER — VALSARTAN AND HYDROCHLOROTHIAZIDE 80; 12.5 MG/1; MG/1
TABLET, FILM COATED ORAL
Qty: 90 TAB | Refills: 2 | Status: SHIPPED | OUTPATIENT
Start: 2020-02-03 | End: 2020-07-20

## 2020-02-03 RX ORDER — METFORMIN HYDROCHLORIDE 500 MG/1
500 TABLET, EXTENDED RELEASE ORAL 2 TIMES DAILY
Qty: 180 TAB | Refills: 2 | Status: SHIPPED | OUTPATIENT
Start: 2020-02-03 | End: 2020-08-31 | Stop reason: SDUPTHER

## 2020-02-03 RX ORDER — CYCLOBENZAPRINE HCL 10 MG
10 TABLET ORAL
Qty: 15 TAB | Refills: 0 | Status: SHIPPED | OUTPATIENT
Start: 2020-02-03 | End: 2020-02-27

## 2020-02-03 RX ORDER — TRAMADOL HYDROCHLORIDE 50 MG/1
50 TABLET ORAL
Qty: 30 TAB | Refills: 0 | Status: SHIPPED | OUTPATIENT
Start: 2020-02-03 | End: 2020-03-06

## 2020-02-03 NOTE — PROGRESS NOTES
Patient is in the office today for 3 month follow up and medication refill. 1. Have you been to the ER, urgent care clinic since your last visit? Hospitalized since your last visit? Yes to [facility] for strain of neck muscle on 12/18/19    2. Have you seen or consulted any other health care providers outside of the [address] [place] [address] since your last visit? Include any pap smears or colon screening.  No

## 2020-02-03 NOTE — PROGRESS NOTES
HISTORY OF PRESENT ILLNESS  Ciarra Saavedra is a 71 y.o. female. Patient presents for chronic care follow up. HPI  Pt talked with her insurance and was told the the Graham Krishnan was covered. Pt needs refills. Review of Systems   Constitutional: Negative. HENT: Negative. Eyes: Negative. Respiratory: Negative. Cardiovascular: Negative. Visit Vitals  /77 (BP 1 Location: Left arm, BP Patient Position: Sitting)   Pulse 91   Temp 98.1 °F (36.7 °C) (Oral)   Resp 18   Ht 5' 10\" (1.778 m)   Wt 143 lb (64.9 kg)   LMP 01/20/2007   SpO2 97%   BMI 20.52 kg/m²       Physical Exam  Constitutional:       General: She is not in acute distress. Appearance: Normal appearance. She is normal weight. She is not ill-appearing. Cardiovascular:      Rate and Rhythm: Normal rate and regular rhythm. Heart sounds: Murmur present. Pulmonary:      Effort: Pulmonary effort is normal. No respiratory distress. Breath sounds: Normal breath sounds. No stridor. No wheezing, rhonchi or rales. Neurological:      Mental Status: She is alert and oriented to person, place, and time. Diabetic foot exam:     Left Foot:   Visual Exam: normal    Pulse DP: 2+ (normal)   Filament test: normal sensation    Vibratory sensation: normal      Right Foot:   Visual Exam: normal    Pulse DP: 2+ (normal)   Filament test: normal sensation    Vibratory sensation: normal    Skin: neelam feet are dry but the skin is intact without fissuring. ASSESSMENT and PLAN    ICD-10-CM ICD-9-CM    1. Essential hypertension with goal blood pressure less than 130/80 I10 401.9 valsartan-hydroCHLOROthiazide (DIOVAN-HCT) 80-12.5 mg per tablet      LIPID PANEL      METABOLIC PANEL, COMPREHENSIVE   2.  Type 2 diabetes mellitus without complication, without long-term current use of insulin (HCC) E11.9 250.00 metFORMIN ER (GLUCOPHAGE XR) 500 mg tablet      MICROALBUMIN, UR, RAND W/ MICROALB/CREAT RATIO      LIPID PANEL      HEMOGLOBIN A1C WITH EAG       DIABETES FOOT EXAM      flash glucose scanning reader (FREESTYLE MICHAEL 14 DAY READER) misc      flash glucose sensor (FREESTYLE MICHAEL 14 DAY SENSOR) kit   3. Rheumatoid arthritis, involving unspecified site, unspecified rheumatoid factor presence (HCC) M06.9 714.0 meloxicam (MOBIC) 15 mg tablet      traMADol (ULTRAM) 50 mg tablet   4. Insomnia, unspecified type G47.00 780.52 zolpidem CR (AMBIEN CR) 6.25 mg tablet   5. Anxiety F41.9 300.00 PARoxetine (PAXIL) 20 mg tablet     PLAN:  Together we reviewed her medication list.    Pt will RTC if she needs help with the [place]. I have discussed the diagnosis with the patient and the intended plan as seen in the above orders. The patient has received an after-visit summary and questions were answered concerning future plans. I have discussed medication side effects and warnings with the patient as well. Patient will call for further questions. Follow-up and Dispositions    · Return in about 6 months (around 8/3/2020) for chronic care.

## 2020-02-03 NOTE — PATIENT INSTRUCTIONS

## 2020-02-27 RX ORDER — CYCLOBENZAPRINE HCL 10 MG
TABLET ORAL
Qty: 15 TAB | Refills: 0 | Status: SHIPPED | OUTPATIENT
Start: 2020-02-27 | End: 2020-09-03 | Stop reason: SDUPTHER

## 2020-03-04 DIAGNOSIS — M06.9 RHEUMATOID ARTHRITIS, INVOLVING UNSPECIFIED SITE, UNSPECIFIED RHEUMATOID FACTOR PRESENCE: ICD-10-CM

## 2020-03-06 RX ORDER — TRAMADOL HYDROCHLORIDE 50 MG/1
50 TABLET ORAL
Qty: 60 TAB | Refills: 0 | Status: SHIPPED | OUTPATIENT
Start: 2020-03-06 | End: 2020-05-01

## 2020-03-16 DIAGNOSIS — G47.00 INSOMNIA, UNSPECIFIED TYPE: ICD-10-CM

## 2020-03-16 DIAGNOSIS — M06.9 RHEUMATOID ARTHRITIS, INVOLVING UNSPECIFIED SITE, UNSPECIFIED RHEUMATOID FACTOR PRESENCE: ICD-10-CM

## 2020-03-16 RX ORDER — TRAMADOL HYDROCHLORIDE 50 MG/1
50 TABLET ORAL
Qty: 60 TAB | Refills: 0 | OUTPATIENT
Start: 2020-03-16 | End: 2020-04-15

## 2020-03-16 RX ORDER — ZOLPIDEM TARTRATE 6.25 MG/1
6.25 TABLET, FILM COATED, EXTENDED RELEASE ORAL
Qty: 30 TAB | Refills: 0 | Status: SHIPPED | OUTPATIENT
Start: 2020-03-16 | End: 2020-08-03 | Stop reason: SDUPTHER

## 2020-03-16 NOTE — TELEPHONE ENCOUNTER
New [facility] for a 20 d/s. Last Visit: 2/3/20 with MOHSEN Walker  Next Appointment: 8/3/20 with MOHSEN Walker  Previous Refill Encounter(s): 3/6/20 #60    Requested Prescriptions     Pending Prescriptions Disp Refills    traMADoL (ULTRAM) 50 mg tablet 60 Tab 0     Sig: Take 1 Tab by mouth every eight (8) hours as needed for Pain for up to 30 days. Max Daily Amount: 150 mg.

## 2020-05-15 RX ORDER — FLUTICASONE PROPIONATE 50 MCG
SPRAY, SUSPENSION (ML) NASAL
Qty: 1 BOTTLE | Refills: 11 | Status: SHIPPED | OUTPATIENT
Start: 2020-05-15 | End: 2021-01-28 | Stop reason: SDUPTHER

## 2020-07-10 ENCOUNTER — TELEPHONE (OUTPATIENT)
Dept: FAMILY MEDICINE CLINIC | Age: 65
End: 2020-07-10

## 2020-07-10 NOTE — TELEPHONE ENCOUNTER
Left name and callback number. The call was to verify if patient prescribed drugs from [facility].

## 2020-07-16 ENCOUNTER — TELEPHONE (OUTPATIENT)
Dept: OTHER | Age: 65
End: 2020-07-16

## 2020-08-03 ENCOUNTER — VIRTUAL VISIT (OUTPATIENT)
Dept: FAMILY MEDICINE CLINIC | Age: 65
End: 2020-08-03

## 2020-08-03 DIAGNOSIS — M06.9 RHEUMATOID ARTHRITIS, INVOLVING UNSPECIFIED SITE, UNSPECIFIED RHEUMATOID FACTOR PRESENCE: Primary | ICD-10-CM

## 2020-08-03 DIAGNOSIS — E11.21 TYPE 2 DIABETES WITH NEPHROPATHY (HCC): ICD-10-CM

## 2020-08-03 DIAGNOSIS — G47.00 INSOMNIA, UNSPECIFIED TYPE: ICD-10-CM

## 2020-08-03 RX ORDER — TRAMADOL HYDROCHLORIDE 50 MG/1
50 TABLET ORAL
Qty: 120 TAB | Refills: 0 | Status: SHIPPED | OUTPATIENT
Start: 2020-08-03 | End: 2020-10-05

## 2020-08-03 RX ORDER — ZOLPIDEM TARTRATE 6.25 MG/1
6.25 TABLET, FILM COATED, EXTENDED RELEASE ORAL
Qty: 30 TAB | Refills: 0 | Status: SHIPPED | OUTPATIENT
Start: 2020-08-03 | End: 2020-08-31

## 2020-08-03 NOTE — PROGRESS NOTES
Pillo Murillo is a 71 y.o. female who was seen by synchronous (real-time) audio-video technology on 8/3/2020 for refill    I am at home. Patient is at home   This was a phone call visit: total time 7 minutes    Patient has not had a related visit in prior 7 days. Subjective:     Pt is doing fine  She just needs a refill on her tramadol. Prior to Admission medications    Medication Sig Start Date End Date Taking? Authorizing Provider   valsartan-hydroCHLOROthiazide (DIOVAN-HCT) 80-12.5 mg per tablet Take 1 Tab by mouth daily. 7/20/20   Thomas Walker NP   fluticasone propionate (FLONASE) 50 mcg/actuation nasal spray Use 2 sprays into each nostril every day as needed for runny nose/allergies 5/15/20   Danni Mosquera MD   zolpidem CR (AMBIEN CR) 6.25 mg tablet Take 1 Tab by mouth nightly as needed for Sleep. Max Daily Amount: 6.25 mg. 3/16/20   Thomas Walker NP   cyclobenzaprine (FLEXERIL) 10 mg tablet take 1 tablet by mouth three times a day if needed for muscle spasm (S) 2/27/20   Thomas Walker NP   metFORMIN ER (GLUCOPHAGE XR) 500 mg tablet Take 1 Tab by mouth two (2) times a day. 2/3/20   Thomas Walker NP   meloxicam [facility]) 15 mg tablet Take with food daily 2/3/20   Thomas Walker NP   PARoxetine (PAXIL) 20 mg tablet Take 1 Tab by mouth daily. 2/3/20   Thomas Walker NP   flash glucose scanning reader (FREESTYLE MICHAEL 14 DAY READER) misc Use as directed 2/3/20   Thomas Walker NP   flash glucose sensor (FREESTYLE MICHAEL 14 DAY SENSOR) kit Use as directed 2/3/20   Thomas Walker NP   lidocaine (LIDODERM) 5 % Apply patch to the affected area for 12 hours a day and remove for 12 hours a day. 12/18/19   David Bailon PA-C   aspirin 81 mg chewable tablet Take 81 mg by mouth daily. Provider, Historical   FEXOFENADINE HCL (ALLEGRA PO) Take 180 mg by mouth daily as needed.     Provider, Historical     Patient Active Problem List    Diagnosis Date Noted    Type 2 diabetes with nephropathy ([facility] 03/16/2018    Type 2 diabetes mellitus without complication, without long-term current use of insulin ([facility] 12/22/2016    Iron deficiency anemia 09/27/2012    HTN (hypertension) 12/19/2011    Heart murmur 12/19/2011    Seasonal allergic rhinitis 12/19/2011    Insomnia 12/19/2011    Rheumatoid arthritis ([facility] 12/19/2011     Current Outpatient Medications   Medication Sig Dispense Refill    valsartan-hydroCHLOROthiazide (DIOVAN-HCT) 80-12.5 mg per tablet Take 1 Tab by mouth daily. 90 Tab 1    fluticasone propionate (FLONASE) 50 mcg/actuation nasal spray Use 2 sprays into each nostril every day as needed for runny nose/allergies 1 Bottle 11    zolpidem CR (AMBIEN CR) 6.25 mg tablet Take 1 Tab by mouth nightly as needed for Sleep. Max Daily Amount: 6.25 mg. 30 Tab 0    cyclobenzaprine (FLEXERIL) 10 mg tablet take 1 tablet by mouth three times a day if needed for muscle spasm (S) 15 Tab 0    metFORMIN ER (GLUCOPHAGE XR) 500 mg tablet Take 1 Tab by mouth two (2) times a day. 180 Tab 2    meloxicam (MOBIC) 15 mg tablet Take with food daily 90 Tab 2    PARoxetine (PAXIL) 20 mg tablet Take 1 Tab by mouth daily. 90 Tab 2    flash glucose scanning reader (FREESTYLE MICHAEL 14 DAY READER) misc Use as directed 1 Each 1    flash glucose sensor (FREESTYLE MICHAEL 14 DAY SENSOR) kit Use as directed 2 Kit 5    lidocaine (LIDODERM) 5 % Apply patch to the affected area for 12 hours a day and remove for 12 hours a day. 15 Each 0    aspirin 81 mg chewable tablet Take 81 mg by mouth daily.  FEXOFENADINE HCL (ALLEGRA PO) Take 180 mg by mouth daily as needed. Allergies   Allergen Reactions    Grass Pollen Runny Nose and Sneezing     Past Medical History:   Diagnosis Date    Arthritis     Arthritis 12/19/2011    Heart murmur 12/19/2011    Heart murmur     History of echocardiogram 08/12/2014    EF 55-60%. No WMA.       History of seasonal allergies 12/19/2011    HTN (hypertension) 2011    Hypertension     Iron deficiency anemia 2012    Normal myocardial perfusion study 2014    No ischemia or prior infarction. Hyperdynamic EF >70%. No RWMA. Neg EKG on EST. Ex time 6 min 11 sec.  Seasonal allergies      Past Surgical History:   Procedure Laterality Date    HX  SECTION      single birth csection   [place] HX  SECTION      a set of twins born by [address]    HX ORTHOPAEDIC  2005    Carpal Tunnel surgery     Family History   Problem Relation Age of Onset    High Cholesterol Mother     Hypertension Father     Diabetes Father      Social History     Tobacco Use    Smoking status: Never Smoker    Smokeless tobacco: Never Used   Substance Use Topics    Alcohol use: No       Review of Systems   Constitutional: Negative. HENT: Negative. Respiratory: Negative. Cardiovascular: Negative. Musculoskeletal: Positive for joint pain. Psychiatric/Behavioral: The patient has insomnia. Diagnoses and all orders for this visit:    Rheumatoid arthritis, involving unspecified site, unspecified rheumatoid factor presence (HCC)  -     traMADoL (ULTRAM) 50 mg tablet; Take 1 Tab by mouth every six (6) hours as needed for Pain for up to 30 days. Max Daily Amount: 200 mg., Normal, Disp-120 Tab,R-0    Insomnia, unspecified type  -     zolpidem CR (AMBIEN CR) 6.25 mg tablet; Take 1 Tab by mouth nightly as needed for Sleep. Max Daily Amount: 6.25 mg., Normal, Disp-30 Tab,R-0    Type 2 diabetes with nephropathy (HCC)      PLAN:  We discussed the Tramadol. The last time it was refilled was 2020 #60 tablets. Together we reviewed her medications and her labs. I have discussed the diagnosis with the patient and the intended plan as seen in the above orders. The patient has received an after-visit summary and questions were answered concerning future plans. I have discussed medication side effects and warnings with the patient as well.  Patient will call for further questions. Follow-up and Dispositions    · Return in about 3 months (around 11/3/2020) for virtual video. We discussed the expected course, resolution and complications of the diagnosis(es) in detail. Medication risks, benefits, costs, interactions, and alternatives were discussed as indicated. I advised her to contact the office if her condition worsens, changes or fails to improve as anticipated. She expressed understanding with the diagnosis(es) and plan. Pascual Jones, who was evaluated through a patient-initiated, synchronous (real-time) audio-video encounter, and/or her healthcare decision maker, is aware that it is a billable service, with coverage as determined by her insurance carrier. She provided verbal consent to proceed: Yes, and patient identification was verified. It was conducted pursuant to the emergency declaration under the [address] [place] [address], [address] [place] [address] authority and the [employer] General Act. A caregiver was present when appropriate. Ability to conduct physical exam was limited. I was at home. The patient was at home.       Wilbur Mustafa NP

## 2020-08-29 DIAGNOSIS — G47.00 INSOMNIA, UNSPECIFIED TYPE: ICD-10-CM

## 2020-08-31 DIAGNOSIS — E11.9 TYPE 2 DIABETES MELLITUS WITHOUT COMPLICATION, WITHOUT LONG-TERM CURRENT USE OF INSULIN (HCC): ICD-10-CM

## 2020-08-31 RX ORDER — METFORMIN HYDROCHLORIDE 500 MG/1
500 TABLET, EXTENDED RELEASE ORAL 2 TIMES DAILY
Qty: 180 TAB | Refills: 2 | Status: SHIPPED | OUTPATIENT
Start: 2020-08-31 | End: 2021-01-28 | Stop reason: SDUPTHER

## 2020-08-31 RX ORDER — ZOLPIDEM TARTRATE 6.25 MG/1
TABLET, FILM COATED, EXTENDED RELEASE ORAL
Qty: 30 TAB | Refills: 0 | Status: SHIPPED | OUTPATIENT
Start: 2020-08-31 | End: 2020-10-07

## 2020-08-31 NOTE — TELEPHONE ENCOUNTER
Solitario Osborne is requesting a 90 day supply  Previous Rx was sent to [facility]    Last Visit: 8/3/20 with MOHSEN Walker  Next Appointment: Advised to follow-up in 3months  Previous Refill Encounter(s): 2/3/20 #180 with 2 refills    Requested Prescriptions     Pending Prescriptions Disp Refills    metFORMIN ER (GLUCOPHAGE XR) 500 mg tablet 180 Tab 2     Sig: Take 1 Tab by mouth two (2) times a day.

## 2020-09-03 ENCOUNTER — TELEPHONE (OUTPATIENT)
Dept: FAMILY MEDICINE CLINIC | Age: 65
End: 2020-09-03

## 2020-09-03 DIAGNOSIS — M06.9 RHEUMATOID ARTHRITIS, INVOLVING UNSPECIFIED SITE, UNSPECIFIED RHEUMATOID FACTOR PRESENCE: ICD-10-CM

## 2020-09-03 RX ORDER — CYCLOBENZAPRINE HCL 10 MG
10 TABLET ORAL
Qty: 15 TAB | Refills: 0 | Status: SHIPPED | OUTPATIENT
Start: 2020-09-03 | End: 2020-12-08 | Stop reason: SDUPTHER

## 2020-09-03 RX ORDER — MELOXICAM 15 MG/1
TABLET ORAL
Qty: 90 TAB | Refills: 2 | Status: SHIPPED | OUTPATIENT
Start: 2020-09-03 | End: 2021-01-28 | Stop reason: SDUPTHER

## 2020-09-03 NOTE — TELEPHONE ENCOUNTER
PA is required for Zolpidem ER 6.25mg    Cover My Meds Key:  Thais Carrero Foreman: [id number] - Rx #: [id number]

## 2020-09-03 NOTE — TELEPHONE ENCOUNTER
Tere Navarrete is requesting a 90 day supply  Previous Rx's were sent to [facility]    Last Visit: 8/3/20 with MOHSEN Walker  Next Appointment: Advised to follow-up in 3 months  Previous Refill Encounter(s): 2/3/20 Mobic #90 with 2 refills, 2/27/20 Flexeril #15    Requested Prescriptions     Pending Prescriptions Disp Refills    cyclobenzaprine (FLEXERIL) 10 mg tablet 15 Tab 0     Sig: Take 1 Tab by mouth three (3) times daily as needed for Muscle Spasm(s).     meloxicam (MOBIC) 15 mg tablet 90 Tab 2     Sig: Take with food daily

## 2020-09-04 NOTE — TELEPHONE ENCOUNTER
Keven Wren (Key: [id number]) - [id number]  Zolpidem Tartrate ER 6.25MG er tablets  Status: PA Response - Approved  Created: September 1st, 2020 [phone]  Sent: September 4th, 2020

## 2020-09-07 ENCOUNTER — TELEPHONE (OUTPATIENT)
Dept: PHARMACY | Age: 65
End: 2020-09-07

## 2020-09-07 NOTE — TELEPHONE ENCOUNTER
CLINICAL PHARMACY: STATIN REVIEW    SUBJECTIVE:   Identified as DM care gap for [employer]: statin therapy. OBJECTIVE:  Allergies   Allergen Reactions    Grass Pollen Runny Nose and Sneezing       Medications per current medication list:  Current Outpatient Medications   Medication Sig Dispense Refill    cyclobenzaprine (FLEXERIL) 10 mg tablet Take 1 Tab by mouth three (3) times daily as needed for Muscle Spasm(s). 15 Tab 0    meloxicam (MOBIC) 15 mg tablet Take with food daily 90 Tab 2    atorvastatin (LIPITOR) 20 mg tablet Take 1 Tablet by Mouth Every Day 90 Tab 1    zolpidem CR (AMBIEN CR) 6.25 mg tablet take 1 tablet by mouth nightly if needed for sleep MAX DAILY AMOUNT 1 TABLET 30 Tab 0    metFORMIN ER (GLUCOPHAGE XR) 500 mg tablet Take 1 Tab by mouth two (2) times a day. 180 Tab 2    valsartan-hydroCHLOROthiazide (DIOVAN-HCT) 80-12.5 mg per tablet Take 1 Tab by mouth daily. 90 Tab 1    fluticasone propionate (FLONASE) 50 mcg/actuation nasal spray Use 2 sprays into each nostril every day as needed for runny nose/allergies 1 Bottle 11    PARoxetine (PAXIL) 20 mg tablet Take 1 Tab by mouth daily. 90 Tab 2    flash glucose scanning reader (FREESTYLE MICHAEL 14 DAY READER) misc Use as directed 1 Each 1    flash glucose sensor (FREESTYLE MICHAEL 14 DAY SENSOR) kit Use as directed 2 Kit 5    lidocaine (LIDODERM) 5 % Apply patch to the affected area for 12 hours a day and remove for 12 hours a day. 15 Each 0    aspirin 81 mg chewable tablet Take 81 mg by mouth daily.  FEXOFENADINE HCL (ALLEGRA PO) Take 180 mg by mouth daily as needed.          Labs:  Lab Results   Component Value Date/Time    Cholesterol, total 164 02/03/2020 03:02 PM    HDL Cholesterol 72 (H) 02/03/2020 03:02 PM    LDL, calculated 76 02/03/2020 03:02 PM    VLDL, calculated 16 02/03/2020 03:02 PM    Triglyceride 80 02/03/2020 03:02 PM    CHOL/HDL Ratio 2.3 02/03/2020 03:02 PM     ALT (SGPT)   Date Value Ref Range Status   02/03/2020 20 13 - 56 U/L Final     AST (SGOT)   Date Value Ref Range Status   02/03/2020 8 (L) 10 - 38 U/L Final       ASCVD risk:  17%    BP Readings from Last 1 Encounters:   02/03/20 130/77       Social History     Tobacco Use    Smoking status: Never Smoker    Smokeless tobacco: Never Used   Substance Use Topics    Alcohol use: No         ASSESSMENT:  Hyperlipidemia Goal: Patient is prescribed moderate-intensity statin therapy. Per chart review, patient is prescribed atorvastatin 20 mg tablets    PLAN:  Will route to clinical pharmacy  to outreach to pharmacy to confirm most recent refill data as well as prescription, prescriber and insurance information.       Thank you,  Demond Hernandez, PharmD, [facility], [address] [facility] Clinical Pharmacist  O: [phone]  Department, toll free: [phone], option 7     CLINICAL PHARMACY CONSULT: MED RECONCILIATION/REVIEW ADDENDUM  For Pharmacy Admin Tracking Only  PHSO: PHSO Patient?: Yes  Total # of Interventions Recommended: Count: 0  - Maintenance Safety Lab Monitoring #: 1  Recommended intervention potential cost savings: 1  Total Interventions Accepted: 0  Time Spent (min): 10

## 2020-09-09 NOTE — TELEPHONE ENCOUNTER
CLINICAL PHARMACY: STATIN THERAPY REVIEW    Identified care gap per [employer] statin use in persons with diabetes and adherence     Per [address]    Medication: Atorvastatin 20mg tablet  Recent  fill dates: 2020  Day supply: 30  Refills remainin  Directions: 1 tab po qd  Insurance billed: [employer]  Prescribing Provider: Joey Morrison NP      No patient outreach planned at this time. Patient received medication and has refills remaining.      [address] [facility], toll free: [phone], option 7

## 2020-10-04 DIAGNOSIS — M06.9 RHEUMATOID ARTHRITIS (HCC): ICD-10-CM

## 2020-10-05 RX ORDER — TRAMADOL HYDROCHLORIDE 50 MG/1
50 TABLET ORAL
Qty: 120 TAB | Refills: 0 | Status: SHIPPED | OUTPATIENT
Start: 2020-10-05 | End: 2020-11-04

## 2020-10-06 DIAGNOSIS — G47.00 INSOMNIA, UNSPECIFIED TYPE: ICD-10-CM

## 2020-10-07 RX ORDER — ZOLPIDEM TARTRATE 6.25 MG/1
TABLET, FILM COATED, EXTENDED RELEASE ORAL
Qty: 30 TAB | Refills: 0 | Status: SHIPPED | OUTPATIENT
Start: 2020-10-07 | End: 2020-11-09

## 2020-10-28 DIAGNOSIS — F41.9 ANXIETY: ICD-10-CM

## 2020-10-28 RX ORDER — PAROXETINE HYDROCHLORIDE 20 MG/1
20 TABLET, FILM COATED ORAL DAILY
Qty: 90 TAB | Refills: 0 | Status: SHIPPED | OUTPATIENT
Start: 2020-10-28 | End: 2021-01-28 | Stop reason: SDUPTHER

## 2020-10-28 NOTE — TELEPHONE ENCOUNTER
Last Visit: 8/3/20 with MOHSEN Walker  Next Appointment: 11/3/20 with MOHSEN Walker  Previous Refill Encounter(s): 2/3/20 #90 with 2 refills    Requested Prescriptions     Pending Prescriptions Disp Refills    PARoxetine (PAXIL) 20 mg tablet 90 Tab 2     Sig: Take 1 Tab by mouth daily.

## 2020-11-05 ENCOUNTER — VIRTUAL VISIT (OUTPATIENT)
Dept: FAMILY MEDICINE CLINIC | Age: 65
End: 2020-11-05

## 2020-11-05 NOTE — PATIENT INSTRUCTIONS
Medicare Wellness Visit, Female The best way to live healthy is to have a lifestyle where you eat a well-balanced diet, exercise regularly, limit alcohol use, and quit all forms of tobacco/nicotine, if applicable. Regular preventive services are another way to keep healthy. Preventive services (vaccines, screening tests, monitoring & exams) can help personalize your care plan, which helps you manage your own care. Screening tests can find health problems at the earliest stages, when they are easiest to treat. [facility] follows the current, evidence-based guidelines published by the [facility] ([facility]) when recommending preventive services for our patients. Because we follow these guidelines, sometimes recommendations change over time as research supports it. (For example, mammograms used to be recommended annually. Even though Medicare will still pay for an annual mammogram, the newer guidelines recommend a mammogram every two years for women of average risk). Of course, you and your doctor may decide to screen more often for some diseases, based on your risk and your co-morbidities (chronic disease you are already diagnosed with). Preventive services for you include: - Medicare offers their members a free annual wellness visit, which is time for you and your primary care provider to discuss and plan for your preventive service needs. Take advantage of this benefit every year! 
-All adults over the age of 72 should receive the recommended pneumonia vaccines. Current USPSTF guidelines recommend a series of two vaccines for the best pneumonia protection.  
-All adults should have a flu vaccine yearly and a tetanus vaccine every 10 years.  
-All adults age 48 and older should receive the shingles vaccines (series of two vaccines). -All adults age 38-68 who are overweight should have a diabetes screening test once every three years. -All adults born between [birth year] and 1966 should be screened once for Hepatitis C. 
-Other screening tests and preventive services for persons with diabetes include: an eye exam to screen for diabetic retinopathy, a kidney function test, a foot exam, and stricter control over your cholesterol.  
-Cardiovascular screening for adults with routine risk involves an electrocardiogram (ECG) at intervals determined by your doctor.  
-Colorectal cancer screenings should be done for adults age 54-65 with no increased risk factors for colorectal cancer. There are a number of acceptable methods of screening for this type of cancer. Each test has its own benefits and drawbacks. Discuss with your doctor what is most appropriate for you during your annual wellness visit. The different tests include: colonoscopy (considered the best screening method), a fecal occult blood test, a fecal DNA test, and sigmoidoscopy. 
 
-A bone mass density test is recommended when a woman turns 65 to screen for osteoporosis. This test is only recommended one time, as a screening. Some providers will use this same test as a disease monitoring tool if you already have osteoporosis. -Breast cancer screenings are recommended every other year for women of normal risk, age 54-69. 
-Cervical cancer screenings for women over age 72 are only recommended with certain risk factors. Here is a list of your current Health Maintenance items (your personalized list of preventive services) with a due date: 
Health Maintenance Due Topic Date Due  Eye Exam  11/12/1965  Pap Test  11/24/2015  Glaucoma Screening   11/12/2019  Bone Mineral Density   11/12/2019 Fernandez Annual Well Visit  08/03/2020

## 2020-11-05 NOTE — PROGRESS NOTES
This is an Initial Medicare Annual Wellness Exam (AWV) (Performed 12 months after IPPE or effective date of Medicare Part B enrollment, Once in a lifetime) I have reviewed the patient's medical history in detail and updated the computerized patient record. History Patient Active Problem List  
Diagnosis Code  
 HTN (hypertension) I10  
 Heart murmur R01.1  Seasonal allergic rhinitis J30.2  Insomnia G47.00  Rheumatoid arthritis ([address] M06.9  Iron deficiency anemia D50.9  Type 2 diabetes mellitus without complication, without long-term current use of insulin (HCC) E11.9  Type 2 diabetes with nephropathy (HCC) E11.21 Past Medical History:  
Diagnosis Date  Arthritis  Arthritis 2011  
 Heart murmur 2011  
 Heart murmur  History of echocardiogram 2014 EF 55-60%. No WMA.  History of seasonal allergies 2011  
 HTN (hypertension) 2011  Hypertension  Iron deficiency anemia 2012  Normal myocardial perfusion study 2014 No ischemia or prior infarction. Hyperdynamic EF >70%. No RWMA. Neg EKG on EST. Ex time 6 min 11 sec.  Seasonal allergies Past Surgical History:  
Procedure Laterality Date [address]  
 single birth csection  HX  SECTION    
 a set of twins born by [employer]  HX ORTHOPAEDIC  2005 Carpal Tunnel surgery Current Outpatient Medications Medication Sig Dispense Refill  PARoxetine (PAXIL) 20 mg tablet Take 1 Tab by mouth daily. 90 Tab 0  
 zolpidem CR (AMBIEN CR) 6.25 mg tablet take 1 tablet by mouth nightly if needed for sleep maximum daily dose of 1 tablet 30 Tab 0  cyclobenzaprine (FLEXERIL) 10 mg tablet Take 1 Tab by mouth three (3) times daily as needed for Muscle Spasm(s).  15 Tab 0  
 meloxicam (MOBIC) 15 mg tablet Take with food daily 90 Tab 2  
 atorvastatin (LIPITOR) 20 mg tablet Take 1 Tablet by Mouth Every Day 90 Tab 1  
  metFORMIN ER (GLUCOPHAGE XR) 500 mg tablet Take 1 Tab by mouth two (2) times a day. 180 Tab 2  
 valsartan-hydroCHLOROthiazide (DIOVAN-HCT) 80-12.5 mg per tablet Take 1 Tab by mouth daily. 90 Tab 1  
 fluticasone propionate (FLONASE) 50 mcg/actuation nasal spray Use 2 sprays into each nostril every day as needed for runny nose/allergies 1 Bottle 11  
 flash glucose scanning reader (FREESTYLE MICHAEL 14 DAY READER) [facility] Use as directed 1 Each 1  
 flash glucose sensor (FREESTYLE MICHAEL 14 DAY SENSOR) kit Use as directed 2 Kit 5  
 lidocaine (LIDODERM) 5 % Apply patch to the affected area for 12 hours a day and remove for 12 hours a day. 15 Each 0  
 aspirin 81 mg chewable tablet Take 81 mg by mouth daily.  FEXOFENADINE HCL (ALLEGRA PO) Take 180 mg by mouth daily as needed. Allergies Allergen Reactions  Grass Pollen Runny Nose and Sneezing Family History Problem Relation Age of Onset  High Cholesterol Mother  Hypertension Father  Diabetes Father Social History Tobacco Use  Smoking status: Never Smoker  Smokeless tobacco: Never Used Substance Use Topics  Alcohol use: No  
 
 
Depression Risk Factor Screening:  
 
3 most recent PHQ Screens 2/3/2020 PHQ Not Done - Little interest or pleasure in doing things Not at all Feeling down, depressed, irritable, or hopeless Not at all Total Score PHQ 2 0 Alcohol Risk Screen Do you average more than 1 drink per night or more than 7 drinks a week:  {Yes/No:655678::\"No\"} On any one occasion in the past three months have you have had more than 3 drinks containing alcohol:  {Yes/No:926173::\"No\"} Functional Ability and Level of Safety:  
Hearing: {Desc; hearing loss:68194::\"Hearing is good. \"} Activities of Daily Living: The home contains: {AWV Home XHHVTX:74686::\"[place] safety equipment. \"} 
{Functional ADL's:81466::\"Patient does total self care\"} Ambulation: {Patient ambulates:07758::\"with no difficulty\"} Fall Risk: 
Fall Risk Assessment, last 12 mths 2/3/2020 Able to walk? Yes Fall in past 12 months? No  
 
Abuse Screen: 
{Abuse Screen:::\"Patient is not abused\"} Cognitive Screening Has your family/caregiver stated any concerns about your memory: {AWV no/yes:47890::\"no\"} {Cognitive Screenin} Patient Care Team  
Patient Care Team: 
Aixa Hough NP as PCP - General (Nurse Practitioner) Assessment/Plan Education and counseling provided: 
{Education List, choose as appropriate:::\"Are appropriate based on today's review and evaluation\"} {There are no diagnoses linked to this encounter. (Refresh or delete this SmartLink)} Health Maintenance Due Topic Date Due  Eye Exam Retinal or Dilated  1965  PAP AKA CERVICAL CYTOLOGY  2015  GLAUCOMA SCREENING Q2Y  2019  Bone Densitometry (Dexa) Screening  2019  Medicare Yearly Exam  2020 Esperanza Schilling, who was evaluated through a synchronous (real-time) {virtual platform audio-video vs audio only:09856::\"audio-video\"} encounter, and/or her healthcare decision maker, is aware that it is a billable service, with coverage as determined by her insurance carrier. She provided verbal consent to proceed: {YES/NO/NA-Consent obtained within past 12 months:75359::\"Yes\"}, and patient identification was verified. It was conducted pursuant to the emergency declaration under the [address] [place] [address], [address] [place] [address] authority and the [employer] Act. A caregiver was present when appropriate. Ability to conduct physical exam was limited. I was {location home office other:26116::\"at home\"}. The patient was {location home office other:00381::\"at home\"}.  
 
 
José Miguel Bass NP

## 2020-11-08 DIAGNOSIS — G47.00 INSOMNIA, UNSPECIFIED TYPE: ICD-10-CM

## 2020-11-09 RX ORDER — ZOLPIDEM TARTRATE 6.25 MG/1
TABLET, FILM COATED, EXTENDED RELEASE ORAL
Qty: 30 TAB | Refills: 0 | Status: SHIPPED | OUTPATIENT
Start: 2020-11-09 | End: 2021-01-28 | Stop reason: SDUPTHER

## 2020-11-25 DIAGNOSIS — F41.9 ANXIETY: ICD-10-CM

## 2020-12-02 RX ORDER — PAROXETINE HYDROCHLORIDE 20 MG/1
TABLET, FILM COATED ORAL
Qty: 90 TAB | Refills: 0 | OUTPATIENT
Start: 2020-12-02

## 2020-12-08 DIAGNOSIS — I10 ESSENTIAL HYPERTENSION WITH GOAL BLOOD PRESSURE LESS THAN 130/80: ICD-10-CM

## 2020-12-09 ENCOUNTER — VIRTUAL VISIT (OUTPATIENT)
Dept: FAMILY MEDICINE CLINIC | Age: 65
End: 2020-12-09

## 2020-12-11 RX ORDER — VALSARTAN AND HYDROCHLOROTHIAZIDE 80; 12.5 MG/1; MG/1
1 TABLET, FILM COATED ORAL DAILY
Qty: 90 TAB | Refills: 0 | Status: SHIPPED | OUTPATIENT
Start: 2020-12-11 | End: 2021-01-28 | Stop reason: SDUPTHER

## 2020-12-11 RX ORDER — CYCLOBENZAPRINE HCL 10 MG
10 TABLET ORAL
Qty: 15 TAB | Refills: 0 | Status: SHIPPED | OUTPATIENT
Start: 2020-12-11 | End: 2021-01-28 | Stop reason: SDUPTHER

## 2020-12-14 ENCOUNTER — VIRTUAL VISIT (OUTPATIENT)
Dept: FAMILY MEDICINE CLINIC | Age: 65
End: 2020-12-14

## 2020-12-14 RX ORDER — ATORVASTATIN CALCIUM 20 MG/1
TABLET, FILM COATED ORAL
Qty: 30 TAB | Refills: 0 | Status: SHIPPED | OUTPATIENT
Start: 2020-12-14 | End: 2021-01-28 | Stop reason: SDUPTHER

## 2020-12-16 ENCOUNTER — VIRTUAL VISIT (OUTPATIENT)
Dept: FAMILY MEDICINE CLINIC | Age: 65
End: 2020-12-16

## 2021-01-12 DIAGNOSIS — I10 ESSENTIAL HYPERTENSION WITH GOAL BLOOD PRESSURE LESS THAN 130/80: ICD-10-CM

## 2021-01-14 RX ORDER — VALSARTAN AND HYDROCHLOROTHIAZIDE 80; 12.5 MG/1; MG/1
TABLET, FILM COATED ORAL
Qty: 30 TAB | Refills: 11 | OUTPATIENT
Start: 2021-01-14

## 2021-01-28 ENCOUNTER — VIRTUAL VISIT (OUTPATIENT)
Dept: FAMILY MEDICINE CLINIC | Age: 66
End: 2021-01-28
Payer: MEDICARE

## 2021-01-28 DIAGNOSIS — Z78.0 POSTMENOPAUSAL STATE: ICD-10-CM

## 2021-01-28 DIAGNOSIS — I10 ESSENTIAL HYPERTENSION WITH GOAL BLOOD PRESSURE LESS THAN 130/80: ICD-10-CM

## 2021-01-28 DIAGNOSIS — E11.21 TYPE 2 DIABETES WITH NEPHROPATHY (HCC): ICD-10-CM

## 2021-01-28 DIAGNOSIS — Z13.31 SCREENING FOR DEPRESSION: ICD-10-CM

## 2021-01-28 DIAGNOSIS — G89.29 OTHER CHRONIC PAIN: ICD-10-CM

## 2021-01-28 DIAGNOSIS — E78.5 HYPERLIPIDEMIA, UNSPECIFIED HYPERLIPIDEMIA TYPE: ICD-10-CM

## 2021-01-28 DIAGNOSIS — M06.9 RHEUMATOID ARTHRITIS INVOLVING MULTIPLE SITES, UNSPECIFIED WHETHER RHEUMATOID FACTOR PRESENT (HCC): ICD-10-CM

## 2021-01-28 DIAGNOSIS — F41.9 ANXIETY: ICD-10-CM

## 2021-01-28 DIAGNOSIS — Z00.00 MEDICARE ANNUAL WELLNESS VISIT, INITIAL: Primary | ICD-10-CM

## 2021-01-28 DIAGNOSIS — G47.00 INSOMNIA, UNSPECIFIED TYPE: ICD-10-CM

## 2021-01-28 PROCEDURE — G0438 PPPS, INITIAL VISIT: HCPCS | Performed by: NURSE PRACTITIONER

## 2021-01-28 PROCEDURE — 99215 OFFICE O/P EST HI 40 MIN: CPT | Performed by: NURSE PRACTITIONER

## 2021-01-28 RX ORDER — ATORVASTATIN CALCIUM 20 MG/1
TABLET, FILM COATED ORAL
Qty: 90 TAB | Refills: 0 | Status: SHIPPED | OUTPATIENT
Start: 2021-01-28 | End: 2021-04-13

## 2021-01-28 RX ORDER — TRAMADOL HYDROCHLORIDE 50 MG/1
50 TABLET ORAL
Qty: 120 TAB | Refills: 0 | Status: SHIPPED | OUTPATIENT
Start: 2021-01-28 | End: 2021-02-13

## 2021-01-28 RX ORDER — METFORMIN HYDROCHLORIDE 500 MG/1
500 TABLET, EXTENDED RELEASE ORAL 2 TIMES DAILY
Qty: 180 TAB | Refills: 0 | Status: SHIPPED | OUTPATIENT
Start: 2021-01-28 | End: 2021-07-12

## 2021-01-28 RX ORDER — GUAIFENESIN 100 MG/5ML
81 LIQUID (ML) ORAL DAILY
Qty: 90 TAB | Refills: 0 | Status: SHIPPED | OUTPATIENT
Start: 2021-01-28 | End: 2021-03-16

## 2021-01-28 RX ORDER — PAROXETINE HYDROCHLORIDE 20 MG/1
20 TABLET, FILM COATED ORAL DAILY
Qty: 90 TAB | Refills: 0 | Status: SHIPPED | OUTPATIENT
Start: 2021-01-28 | End: 2021-04-13

## 2021-01-28 RX ORDER — MELOXICAM 15 MG/1
TABLET ORAL
Qty: 90 TAB | Refills: 2 | Status: SHIPPED | OUTPATIENT
Start: 2021-01-28 | End: 2021-11-29

## 2021-01-28 RX ORDER — ZOLPIDEM TARTRATE 6.25 MG/1
TABLET, FILM COATED, EXTENDED RELEASE ORAL
Qty: 90 TAB | Refills: 0 | Status: SHIPPED | OUTPATIENT
Start: 2021-01-28 | End: 2021-03-16

## 2021-01-28 RX ORDER — CYCLOBENZAPRINE HCL 10 MG
10 TABLET ORAL
Qty: 30 TAB | Refills: 0 | Status: SHIPPED | OUTPATIENT
Start: 2021-01-28 | End: 2021-02-13

## 2021-01-28 RX ORDER — FLUTICASONE PROPIONATE 50 MCG
SPRAY, SUSPENSION (ML) NASAL
Qty: 1 BOTTLE | Refills: 11 | Status: SHIPPED | OUTPATIENT
Start: 2021-01-28 | End: 2021-11-29

## 2021-01-28 RX ORDER — VALSARTAN AND HYDROCHLOROTHIAZIDE 80; 12.5 MG/1; MG/1
1 TABLET, FILM COATED ORAL DAILY
Qty: 90 TAB | Refills: 0 | Status: SHIPPED | OUTPATIENT
Start: 2021-01-28 | End: 2021-04-13

## 2021-01-28 NOTE — PROGRESS NOTES
This is an Initial Medicare Annual Wellness Exam (AWV) (Performed 12 months after IPPE or effective date of Medicare Part B enrollment, Once in a lifetime)    I have reviewed the patient's medical history in detail and updated the computerized patient record. Depression Risk Factor Screening:     3 most recent PHQ Screens 1/28/2021   PHQ Not Done -   Little interest or pleasure in doing things Not at all   Feeling down, depressed, irritable, or hopeless Not at all   Total Score PHQ 2 0       Alcohol Risk Screen    Do you average more than 1 drink per night or more than 7 drinks a week:  No    On any one occasion in the past three months have you have had more than 3 drinks containing alcohol:  No    Functional Ability and Level of Safety:    Hearing: Hearing is good. Activities of Daily Living: The home contains: no safety equipment. Patient does total self care      Ambulation: with no difficulty      Fall Risk:  Fall Risk Assessment, last 12 mths 2/3/2020   Able to walk? Yes   Fall in past 12 months? No      Abuse Screen:  Patient is not abused       Cognitive Screening    Has your family/caregiver stated any concerns about your memory: no     Cognitive Screening: A+OX3    Assessment/Plan   Education and counseling provided:  Are appropriate based on today's review and evaluation    Diagnoses and all orders for this visit:    1. Medicare annual wellness visit, initial    2. Essential hypertension with goal blood pressure less than 137/17  -     METABOLIC PANEL, BASIC; Future  -     valsartan-hydroCHLOROthiazide (DIOVAN-HCT) 80-12.5 mg per tablet; Take 1 Tab by mouth daily. 3. Rheumatoid arthritis involving multiple sites, unspecified whether rheumatoid factor present (HCC)  -     cyclobenzaprine (FLEXERIL) 10 mg tablet; Take 1 Tab by mouth three (3) times daily as needed for Muscle Spasm(s). -     meloxicam (MOBIC) 15 mg tablet; Take with food daily  -     REFERRAL TO PAIN MANAGEMENT    4.  Type 2 diabetes with nephropathy ([facility]  -     HEMOGLOBIN A1C WITH EAG; Future  -     MICROALBUMIN, UR, RAND W/ MICROALB/CREAT RATIO; Future  -     aspirin 81 mg chewable tablet; Take 1 Tab by mouth daily. -     metFORMIN ER (GLUCOPHAGE XR) 500 mg tablet; Take 1 Tab by mouth two (2) times a day. 5. Anxiety  -     PARoxetine (PAXIL) 20 mg tablet; Take 1 Tab by mouth daily. 6. Insomnia, unspecified type  -     zolpidem CR (AMBIEN CR) 6.25 mg tablet; take 1 tablet by mouth nightly if needed for sleep MAX DAILY AMOUNT: 1 TABLET    7. Other chronic pain  -     COMPLIANCE DRUG SCREEN/PRESCRIPTION MONITORING; Future  -     traMADoL (Ultram) 50 mg tablet; Take 1 Tab by mouth every six (6) hours as needed for Pain for up to 30 days. Max Daily Amount: 200 mg.  -     REFERRAL TO PAIN MANAGEMENT    8. Hyperlipidemia, unspecified hyperlipidemia type  -     LIPID PANEL; Future  -     atorvastatin (LIPITOR) 20 mg tablet; Take 1 tablet by mouth every day  -     ALT; Future  -     AST; Future    9. Postmenopausal state  -     DEXA BONE DENSITY STUDY AXIAL; Future    10.  Screening for depression  -     DEPRESSION SCREEN ANNUAL    Other orders  -     fluticasone propionate (FLONASE) 50 mcg/actuation nasal spray; Use 2 sprays into each nostril every day as needed for runny nose/allergies         Health Maintenance Due     Health Maintenance Due   Topic Date Due    Eye Exam Retinal or Dilated  11/12/1965    COVID-19 Vaccine (1 of 2) 11/12/1970    Shingrix Vaccine Age 50> (1 of 2) 11/12/2004    PAP AKA CERVICAL CYTOLOGY  11/24/2015    GLAUCOMA SCREENING Q2Y  11/12/2019    Bone Densitometry (Dexa) Screening  11/12/2019    Foot Exam Q1  02/03/2021    A1C test (Diabetic or Prediabetic)  02/03/2021    MICROALBUMIN Q1  02/03/2021    Lipid Screen  02/03/2021       Patient Care Team   Patient Care Team:  Mj Singh NP as PCP - General (Nurse Practitioner)  Mj Singh NP as PCP - [facility] Empaneled Provider    History Patient Active Problem List   Diagnosis Code    HTN (hypertension) I10    Heart murmur R01.1    Seasonal allergic rhinitis J30.2    Insomnia G47.00    Rheumatoid arthritis ([facility] M06.9    Iron deficiency anemia D50.9    Type 2 diabetes mellitus without complication, without long-term current use of insulin (HCC) E11.9    Type 2 diabetes with nephropathy ([facility] E11.21     Past Medical History:   Diagnosis Date    Arthritis     Arthritis 2011    Heart murmur 2011    Heart murmur     History of echocardiogram 2014    EF 55-60%. No WMA.  History of seasonal allergies 2011    HTN (hypertension) 2011    Hypertension     Iron deficiency anemia 2012    Normal myocardial perfusion study 2014    No ischemia or prior infarction. Hyperdynamic EF >70%. No RWMA. Neg EKG on EST. Ex time 6 min 11 sec.  Seasonal allergies       Past Surgical History:   Procedure Laterality Date    HX  SECTION      single birth csection   [facility] HX  SECTION      a set of twins born by Csection    HX ORTHOPAEDIC  2005    Carpal Tunnel surgery     Current Outpatient Medications   Medication Sig Dispense Refill    traMADoL (Ultram) 50 mg tablet Take 1 Tab by mouth every six (6) hours as needed for Pain for up to 30 days. Max Daily Amount: 200 mg. 120 Tab 0    aspirin 81 mg chewable tablet Take 1 Tab by mouth daily. 90 Tab 0    atorvastatin (LIPITOR) 20 mg tablet Take 1 tablet by mouth every day 90 Tab 0    fluticasone propionate (FLONASE) 50 mcg/actuation nasal spray Use 2 sprays into each nostril every day as needed for runny nose/allergies 1 Bottle 11    metFORMIN ER (GLUCOPHAGE XR) 500 mg tablet Take 1 Tab by mouth two (2) times a day. 180 Tab 0    PARoxetine (PAXIL) 20 mg tablet Take 1 Tab by mouth daily. 90 Tab 0    valsartan-hydroCHLOROthiazide (DIOVAN-HCT) 80-12.5 mg per tablet Take 1 Tab by mouth daily.  90 Tab 0    zolpidem CR (AMBIEN CR) 6.25 mg tablet take 1 tablet by mouth nightly if needed for sleep MAX DAILY AMOUNT: 1 TABLET 90 Tab 0    cyclobenzaprine (FLEXERIL) 10 mg tablet Take 1 Tab by mouth three (3) times daily as needed for Muscle Spasm(s). 30 Tab 0    meloxicam (MOBIC) 15 mg tablet Take with food daily 90 Tab 2    flash glucose scanning reader (FREESTYLE MICHAEL 14 DAY READER) [facility] Use as directed 1 Each 1    flash glucose sensor (FREESTYLE MICHAEL 14 DAY SENSOR) kit Use as directed 2 Kit 5    FEXOFENADINE HCL (ALLEGRA PO) Take 180 mg by mouth daily as needed. Allergies   Allergen Reactions    Grass Pollen Runny Nose and Sneezing       Family History   Problem Relation Age of Onset    High Cholesterol Mother     Hypertension Father     Diabetes Father      Social History     Tobacco Use    Smoking status: Never Smoker    Smokeless tobacco: Never Used   Substance Use Topics    Alcohol use: No       Suhas Quache, who was evaluated through a synchronous (real-time) audio-video encounter, and/or her healthcare decision maker, is aware that it is a billable service, with coverage as determined by her insurance carrier. She provided verbal consent to proceed: Yes, and patient identification was verified. It was conducted pursuant to the emergency declaration under the [address] [place] [address] authority and the [employer] Act. A caregiver was present when appropriate. Ability to conduct physical exam was limited. I was in the office. The patient was at home.       Brian Rasheed NP

## 2021-01-28 NOTE — PATIENT INSTRUCTIONS
Medicare Wellness Visit, Female The best way to live healthy is to have a lifestyle where you eat a well-balanced diet, exercise regularly, limit alcohol use, and quit all forms of tobacco/nicotine, if applicable. Regular preventive services are another way to keep healthy. Preventive services (vaccines, screening tests, monitoring & exams) can help personalize your care plan, which helps you manage your own care. Screening tests can find health problems at the earliest stages, when they are easiest to treat. [facility] follows the current, evidence-based guidelines published by the [facility] ([facility]) when recommending preventive services for our patients. Because we follow these guidelines, sometimes recommendations change over time as research supports it. (For example, mammograms used to be recommended annually. Even though Medicare will still pay for an annual mammogram, the newer guidelines recommend a mammogram every two years for women of average risk). Of course, you and your doctor may decide to screen more often for some diseases, based on your risk and your co-morbidities (chronic disease you are already diagnosed with). Preventive services for you include: - Medicare offers their members a free annual wellness visit, which is time for you and your primary care provider to discuss and plan for your preventive service needs. Take advantage of this benefit every year! 
-All adults over the age of 72 should receive the recommended pneumonia vaccines. Current USPSTF guidelines recommend a series of two vaccines for the best pneumonia protection.  
-All adults should have a flu vaccine yearly and a tetanus vaccine every 10 years.  
-All adults age 48 and older should receive the shingles vaccines (series of two vaccines). -All adults age 38-68 who are overweight should have a diabetes screening test once every three years. -All adults born between [birth year] and 1965 should be screened once for Hepatitis C. 
-Other screening tests and preventive services for persons with diabetes include: an eye exam to screen for diabetic retinopathy, a kidney function test, a foot exam, and stricter control over your cholesterol.  
-Cardiovascular screening for adults with routine risk involves an electrocardiogram (ECG) at intervals determined by your doctor.  
-Colorectal cancer screenings should be done for adults age 54-65 with no increased risk factors for colorectal cancer. There are a number of acceptable methods of screening for this type of cancer. Each test has its own benefits and drawbacks. Discuss with your doctor what is most appropriate for you during your annual wellness visit. The different tests include: colonoscopy (considered the best screening method), a fecal occult blood test, a fecal DNA test, and sigmoidoscopy. 
 
-A bone mass density test is recommended when a woman turns 65 to screen for osteoporosis. This test is only recommended one time, as a screening. Some providers will use this same test as a disease monitoring tool if you already have osteoporosis. -Breast cancer screenings are recommended every other year for women of normal risk, age 54-69. 
-Cervical cancer screenings for women over age 72 are only recommended with certain risk factors. Here is a list of your current Health Maintenance items (your personalized list of preventive services) with a due date: 
Health Maintenance Due Topic Date Due  Eye Exam  11/12/1965  COVID-19 Vaccine (1 of 2) 11/12/1970  Shingles Vaccine (1 of 2) 11/12/2004  Pap Test  11/24/2015  Glaucoma Screening   11/12/2019  Bone Mineral Density   11/12/2019 [facility] Diabetic Foot Care  02/03/2021  Hemoglobin A1C    02/03/2021  Albumin Urine Test  02/03/2021  Cholesterol Test   02/03/2021 Medicare Wellness Visit, Female The best way to live healthy is to have a lifestyle where you eat a well-balanced diet, exercise regularly, limit alcohol use, and quit all forms of tobacco/nicotine, if applicable. Regular preventive services are another way to keep healthy. Preventive services (vaccines, screening tests, monitoring & exams) can help personalize your care plan, which helps you manage your own care. Screening tests can find health problems at the earliest stages, when they are easiest to treat. [facility] follows the current, evidence-based guidelines published by the [facility] ([facility]) when recommending preventive services for our patients. Because we follow these guidelines, sometimes recommendations change over time as research supports it. (For example, mammograms used to be recommended annually. Even though Medicare will still pay for an annual mammogram, the newer guidelines recommend a mammogram every two years for women of average risk). Of course, you and your doctor may decide to screen more often for some diseases, based on your risk and your co-morbidities (chronic disease you are already diagnosed with). Preventive services for you include: - Medicare offers their members a free annual wellness visit, which is time for you and your primary care provider to discuss and plan for your preventive service needs. Take advantage of this benefit every year! 
-All adults over the age of 72 should receive the recommended pneumonia vaccines. Current USPSTF guidelines recommend a series of two vaccines for the best pneumonia protection.  
-All adults should have a flu vaccine yearly and a tetanus vaccine every 10 years.  
-All adults age 48 and older should receive the shingles vaccines (series of two vaccines). -All adults age 38-68 who are overweight should have a diabetes screening test once every three years. -All adults born between 80 and 1966 should be screened once for Hepatitis C. 
-Other screening tests and preventive services for persons with diabetes include: an eye exam to screen for diabetic retinopathy, a kidney function test, a foot exam, and stricter control over your cholesterol.  
-Cardiovascular screening for adults with routine risk involves an electrocardiogram (ECG) at intervals determined by your doctor.  
-Colorectal cancer screenings should be done for adults age 54-65 with no increased risk factors for colorectal cancer. There are a number of acceptable methods of screening for this type of cancer. Each test has its own benefits and drawbacks. Discuss with your doctor what is most appropriate for you during your annual wellness visit. The different tests include: colonoscopy (considered the best screening method), a fecal occult blood test, a fecal DNA test, and sigmoidoscopy. 
 
-A bone mass density test is recommended when a woman turns 65 to screen for osteoporosis. This test is only recommended one time, as a screening. Some providers will use this same test as a disease monitoring tool if you already have osteoporosis. -Breast cancer screenings are recommended every other year for women of normal risk, age 54-69. 
-Cervical cancer screenings for women over age 72 are only recommended with certain risk factors. Here is a list of your current Health Maintenance items (your personalized list of preventive services) with a due date: 
Health Maintenance Due Topic Date Due  Eye Exam  11/12/1965  COVID-19 Vaccine (1 of 2) 11/12/1970  Shingles Vaccine (1 of 2) 11/12/2004  Pap Test  11/24/2015  Glaucoma Screening   11/12/2019  Bone Mineral Density   11/12/2019 [facility] Diabetic Foot Care  02/03/2021  Hemoglobin A1C    02/03/2021  Albumin Urine Test  02/03/2021  Cholesterol Test   02/03/2021

## 2021-02-01 DIAGNOSIS — G89.29 OTHER CHRONIC PAIN: ICD-10-CM

## 2021-02-01 DIAGNOSIS — M06.9 RHEUMATOID ARTHRITIS INVOLVING MULTIPLE SITES, UNSPECIFIED WHETHER RHEUMATOID FACTOR PRESENT (HCC): ICD-10-CM

## 2021-02-02 RX ORDER — TRAMADOL HYDROCHLORIDE 50 MG/1
TABLET ORAL
Qty: 120 TAB | Refills: 5 | OUTPATIENT
Start: 2021-02-02

## 2021-02-02 RX ORDER — CYCLOBENZAPRINE HCL 10 MG
TABLET ORAL
Qty: 30 TAB | Refills: 11 | OUTPATIENT
Start: 2021-02-02

## 2021-02-03 RX ORDER — TRAMADOL HYDROCHLORIDE 50 MG/1
50 TABLET ORAL
Qty: 120 TAB | Refills: 0 | Status: CANCELLED | OUTPATIENT
Start: 2021-02-03 | End: 2021-03-05

## 2021-02-03 RX ORDER — CYCLOBENZAPRINE HCL 10 MG
10 TABLET ORAL
Qty: 30 TAB | Refills: 0 | Status: CANCELLED | OUTPATIENT
Start: 2021-02-03

## 2021-02-03 NOTE — TELEPHONE ENCOUNTER
Brennon called back and left a message stating they were mistaken and they did received the Rx's. Closing this encounter.

## 2021-02-03 NOTE — TELEPHONE ENCOUNTER
Brittany Mabry states they never received the Ultram or Flexeril scripts we sent on 1/28/21. Meds have been pended again. Requested Prescriptions     Pending Prescriptions Disp Refills    traMADoL (Ultram) 50 mg tablet 120 Tab 0     Sig: Take 1 Tab by mouth every six (6) hours as needed for Pain for up to 30 days. Max Daily Amount: 200 mg.  cyclobenzaprine (FLEXERIL) 10 mg tablet 30 Tab 0     Sig: Take 1 Tab by mouth three (3) times daily as needed for Muscle Spasm(s). Refused Prescriptions Disp Refills    traMADoL (ULTRAM) 50 mg tablet [Pharmacy Med Name: Tramadol Hcl 50mg Tablet] 120 Tab 5     Sig: Take 1 tablet by mouth every 6 hours as needed for pain.  Max 4 tabs/day     Refused By: Rashida Core     Reason for Refusal: Request already responded to by other means (e.g. phone or fax)    cyclobenzaprine (FLEXERIL) 10 mg tablet [Pharmacy Med Name: Cyclobenzaprine 10mg Tab] 30 Tab 11     Sig: Take 1 tablet by mouth 3 times a day as needed for muscle spasm     Refused By: [place] Core     Reason for Refusal: Request already responded to by other means (e.g. phone or fax)

## 2021-02-10 NOTE — PROGRESS NOTES
Sylvia Patricia is a 76 y.o. female who was seen by synchronous (real-time) audio-video technology on 1/28/2021 for Annual Wellness Visit and Medication Refill    Pt is being evaluated for medication refills. Pt is taking all medications as prescribed with no noted side effects. Pain Review:    She presents do to pain of her bilateral hand, bilateral hip, bilateral knee, neck and bilateral shoulder that is secondary to no known injury and started about 4 years ago. Since it started her pain has not changed. She describes the pain as sharp, throbbing, weakness. It is intermittent. The pain radiates: no where. She paresthesias in her hands, knees and hips. Exacerbating factors identifiable by patient are recumbency, it is more prominent in the colder months with stiffness in the morning. She has tried the following: massage, pain medication and stretching. These have been: temporarily effective. Previous workup: none, Seen rheumatology years ago, no longer following.  reviewed: yes    No results found for: It was explained to pt that I Antoni Diaz FNP-C) do not treat chronic pain with opioid medication, and that a pain contract would need to be sign in order for me to continue to provide pain medication to her  on a limited bases. It was also explained that a referral would be placed for pain management to take over her medication. Random urine compliance screenings will also be conducted on a periodic basis as well as  monitoring. Pt verbalized understanding of the contract, drug compliance screening, and referral process to pain management. Assessment & Plan:   Diagnoses and all orders for this visit:    1. Medicare annual wellness visit, initial    2. Essential hypertension with goal blood pressure less than 941/43  -     METABOLIC PANEL, BASIC; Future  -     valsartan-hydroCHLOROthiazide (DIOVAN-HCT) 80-12.5 mg per tablet; Take 1 Tab by mouth daily.     3. Rheumatoid arthritis involving multiple sites, unspecified whether rheumatoid factor present ([facility] [address]  -     cyclobenzaprine (FLEXERIL) 10 mg tablet; Take 1 Tab by mouth three (3) times daily as needed for Muscle Spasm(s). -     meloxicam (MOBIC) 15 mg tablet; Take with food daily  -     REFERRAL TO PAIN MANAGEMENT    4. Type 2 diabetes with nephropathy (HCC)  -     HEMOGLOBIN A1C WITH EAG; Future  -     MICROALBUMIN, UR, RAND W/ MICROALB/CREAT RATIO; Future  -     aspirin 81 mg chewable tablet; Take 1 Tab by mouth daily. -     metFORMIN ER (GLUCOPHAGE XR) 500 mg tablet; Take 1 Tab by mouth two (2) times a day. 5. Anxiety  -     PARoxetine (PAXIL) 20 mg tablet; Take 1 Tab by mouth daily. 6. Insomnia, unspecified type  -     zolpidem CR (AMBIEN CR) 6.25 mg tablet; take 1 tablet by mouth nightly if needed for sleep MAX DAILY AMOUNT: 1 TABLET    7. Other chronic pain  -     COMPLIANCE DRUG SCREEN/PRESCRIPTION MONITORING; Future  -     traMADoL (Ultram) 50 mg tablet; Take 1 Tab by mouth every six (6) hours as needed for Pain for up to 30 days. Max Daily Amount: 200 mg.  -     REFERRAL TO PAIN MANAGEMENT    8. Hyperlipidemia, unspecified hyperlipidemia type  -     LIPID PANEL; Future  -     atorvastatin (LIPITOR) 20 mg tablet; Take 1 tablet by mouth every day  -     ALT; Future  -     AST; Future    9. Postmenopausal state  -     DEXA BONE DENSITY STUDY AXIAL; Future    10. Screening for depression  -     DEPRESSION SCREEN ANNUAL    Other orders  -     fluticasone propionate (FLONASE) 50 mcg/actuation nasal spray; Use 2 sprays into each nostril every day as needed for runny nose/allergies      Follow-up and Dispositions    Return in about 3 months (around 4/28/2021), or if symptoms worsen or fail to improve, for Chronic Disease Management, (In Office). Routing History      [id number]  Subjective:       Prior to Admission medications    Medication Sig Start Date End Date Taking?  Authorizing Provider   traMADoL (Ultram) 50 mg tablet Take 1 Tab by mouth every six (6) hours as needed for Pain for up to 30 days. Max Daily Amount: 200 mg. 1/28/21 2/27/21 Yes Niles Days, NP   aspirin 81 mg chewable tablet Take 1 Tab by mouth daily. 1/28/21  Yes [place] Days, NP   atorvastatin (LIPITOR) 20 mg tablet Take 1 tablet by mouth every day 1/28/21  Yes Niles Days, NP   fluticasone propionate (FLONASE) 50 mcg/actuation nasal spray Use 2 sprays into each nostril every day as needed for runny nose/allergies 1/28/21  Yes Niles Days, NP   metFORMIN ER (GLUCOPHAGE XR) 500 mg tablet Take 1 Tab by mouth two (2) times a day. 1/28/21  Yes Rashid Days, NP   PARoxetine (PAXIL) 20 mg tablet Take 1 Tab by mouth daily. 1/28/21  Yes [place] Days, NP   valsartan-hydroCHLOROthiazide (DIOVAN-HCT) 80-12.5 mg per tablet Take 1 Tab by mouth daily. 1/28/21  Yes [place] Days, NP   zolpidem CR (AMBIEN CR) 6.25 mg tablet take 1 tablet by mouth nightly if needed for sleep MAX DAILY AMOUNT: 1 TABLET 1/28/21  Yes [place] Days, NP   cyclobenzaprine (FLEXERIL) 10 mg tablet Take 1 Tab by mouth three (3) times daily as needed for Muscle Spasm(s). 1/28/21  Yes [place] Days, NP   meloxicam (MOBIC) 15 mg tablet Take with food daily 1/28/21  Yes [place] Days, MOHSEN   flash glucose scanning reader (FREESTYLE MICHAEL 14 DAY READER) misc Use as directed 2/3/20  Yes Shannan Walker NP   flash glucose sensor (FREESTYLE MICHAEL 14 DAY SENSOR) kit Use as directed 2/3/20  Yes Shannan Walker NP   FEXOFENADINE HCL (ALLEGRA PO) Take 180 mg by mouth daily as needed.    Yes Provider, Historical     Patient Active Problem List   Diagnosis Code    HTN (hypertension) I10    Heart murmur R01.1    Seasonal allergic rhinitis J30.2    Insomnia G47.00    Rheumatoid arthritis ([address] M06.9    Iron deficiency anemia D50.9    Type 2 diabetes mellitus without complication, without long-term current use of insulin ([facility] [address] E11.9    Type 2 diabetes with nephropathy (HCC) E11.21 Current Outpatient Medications   Medication Sig Dispense Refill    traMADoL (Ultram) 50 mg tablet Take 1 Tab by mouth every six (6) hours as needed for Pain for up to 30 days. Max Daily Amount: 200 mg. 120 Tab 0    aspirin 81 mg chewable tablet Take 1 Tab by mouth daily. 90 Tab 0    atorvastatin (LIPITOR) 20 mg tablet Take 1 tablet by mouth every day 90 Tab 0    fluticasone propionate (FLONASE) 50 mcg/actuation nasal spray Use 2 sprays into each nostril every day as needed for runny nose/allergies 1 Bottle 11    metFORMIN ER (GLUCOPHAGE XR) 500 mg tablet Take 1 Tab by mouth two (2) times a day. 180 Tab 0    PARoxetine (PAXIL) 20 mg tablet Take 1 Tab by mouth daily. 90 Tab 0    valsartan-hydroCHLOROthiazide (DIOVAN-HCT) 80-12.5 mg per tablet Take 1 Tab by mouth daily. 90 Tab 0    zolpidem CR (AMBIEN CR) 6.25 mg tablet take 1 tablet by mouth nightly if needed for sleep MAX DAILY AMOUNT: 1 TABLET 90 Tab 0    cyclobenzaprine (FLEXERIL) 10 mg tablet Take 1 Tab by mouth three (3) times daily as needed for Muscle Spasm(s). 30 Tab 0    meloxicam (MOBIC) 15 mg tablet Take with food daily 90 Tab 2    flash glucose scanning reader (FREESTYLE MICHAEL 14 DAY READER) [facility] Use as directed 1 Each 1    flash glucose sensor (FREESTYLE MICHAEL 14 DAY SENSOR) kit Use as directed 2 Kit 5    FEXOFENADINE HCL (ALLEGRA PO) Take 180 mg by mouth daily as needed. Allergies   Allergen Reactions    Grass Pollen Runny Nose and Sneezing       ROS    Objective:   No flowsheet data found.    General: alert, cooperative, no distress   Mental  status: normal mood, behavior, speech, dress, motor activity, and thought processes, able to follow commands   HENT: NCAT   Neck: no visualized mass   Resp: no respiratory distress   Neuro: no gross deficits   Skin: no discoloration or lesions of concern on visible areas   Psychiatric: normal affect, consistent with stated mood, no evidence of hallucinations     Additional exam findings: N/A      We discussed the expected course, resolution and complications of the diagnosis(es) in detail. Medication risks, benefits, costs, interactions, and alternatives were discussed as indicated. I advised her to contact the office if her condition worsens, changes or fails to improve as anticipated. She expressed understanding with the diagnosis(es) and plan. Tiffani Harris, who was evaluated through a patient-initiated, synchronous (real-time) audio-video encounter, and/or her healthcare decision maker, is aware that it is a billable service, with coverage as determined by her insurance carrier. She provided verbal consent to proceed: Yes, and patient identification was verified. It was conducted pursuant to the emergency declaration under the [address] [place] [address], [address] [place] [address] authority and the [employer] General Act. A caregiver was present when appropriate. Ability to conduct physical exam was limited. I was in the office. The patient was at home.       Adin Kraft

## 2021-02-11 DIAGNOSIS — G89.29 OTHER CHRONIC PAIN: ICD-10-CM

## 2021-02-13 RX ORDER — TRAMADOL HYDROCHLORIDE 50 MG/1
50 TABLET ORAL
Qty: 120 TAB | Refills: 0 | Status: SHIPPED | OUTPATIENT
Start: 2021-02-13 | End: 2021-04-29 | Stop reason: SDUPTHER

## 2021-03-01 DIAGNOSIS — E11.9 TYPE 2 DIABETES MELLITUS WITHOUT COMPLICATION, WITHOUT LONG-TERM CURRENT USE OF INSULIN (HCC): ICD-10-CM

## 2021-03-01 RX ORDER — FLASH GLUCOSE SENSOR
KIT MISCELLANEOUS
Qty: 1 KIT | Refills: 0 | Status: SHIPPED | OUTPATIENT
Start: 2021-03-01

## 2021-03-01 RX ORDER — FLASH GLUCOSE SCANNING READER
EACH MISCELLANEOUS
Qty: 1 EACH | Refills: 0 | Status: SHIPPED | OUTPATIENT
Start: 2021-03-01
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
## 2021-04-22 ENCOUNTER — APPOINTMENT (OUTPATIENT)
Dept: FAMILY MEDICINE CLINIC | Age: 66
End: 2021-04-22

## 2021-04-23 LAB
ALT SERPL-CCNC: 18 IU/L (ref 0–32)
AST SERPL-CCNC: 22 IU/L (ref 0–40)

## 2021-04-27 LAB
ALBUMIN/CREAT UR: 9 MG/G CREAT (ref 0–29)
BUN SERPL-MCNC: 18 MG/DL (ref 8–27)
BUN/CREAT SERPL: 20 (ref 12–28)
CALCIUM SERPL-MCNC: 10.3 MG/DL (ref 8.7–10.3)
CHLORIDE SERPL-SCNC: 103 MMOL/L (ref 96–106)
CHOLEST SERPL-MCNC: 104 MG/DL (ref 100–199)
CO2 SERPL-SCNC: 25 MMOL/L (ref 20–29)
CREAT SERPL-MCNC: 0.88 MG/DL (ref 0.57–1)
CREAT UR-MCNC: 190.7 MG/DL
DRUGS UR: NORMAL
EST. AVERAGE GLUCOSE BLD GHB EST-MCNC: 131 MG/DL
GLUCOSE SERPL-MCNC: 99 MG/DL (ref 65–99)
HBA1C MFR BLD: 6.2 % (ref 4.8–5.6)
HDLC SERPL-MCNC: 58 MG/DL
LDLC SERPL CALC-MCNC: 29 MG/DL (ref 0–99)
MICROALBUMIN UR-MCNC: 16.8 UG/ML
POTASSIUM SERPL-SCNC: 4.6 MMOL/L (ref 3.5–5.2)
SODIUM SERPL-SCNC: 143 MMOL/L (ref 134–144)
TRIGL SERPL-MCNC: 86 MG/DL (ref 0–149)
VLDLC SERPL CALC-MCNC: 17 MG/DL (ref 5–40)

## 2021-04-29 ENCOUNTER — OFFICE VISIT (OUTPATIENT)
Dept: FAMILY MEDICINE CLINIC | Age: 66
End: 2021-04-29
Payer: MEDICARE

## 2021-04-29 VITALS
TEMPERATURE: 97.2 F | OXYGEN SATURATION: 98 % | BODY MASS INDEX: 20.33 KG/M2 | WEIGHT: 142 LBS | SYSTOLIC BLOOD PRESSURE: 124 MMHG | HEART RATE: 75 BPM | HEIGHT: 70 IN | DIASTOLIC BLOOD PRESSURE: 67 MMHG | RESPIRATION RATE: 18 BRPM

## 2021-04-29 DIAGNOSIS — E11.9 TYPE 2 DIABETES MELLITUS WITHOUT COMPLICATION, WITHOUT LONG-TERM CURRENT USE OF INSULIN (HCC): ICD-10-CM

## 2021-04-29 DIAGNOSIS — Z12.31 ENCOUNTER FOR SCREENING MAMMOGRAM FOR MALIGNANT NEOPLASM OF BREAST: ICD-10-CM

## 2021-04-29 DIAGNOSIS — R01.1 HEART MURMUR: ICD-10-CM

## 2021-04-29 DIAGNOSIS — E11.9 COMPREHENSIVE DIABETIC FOOT EXAMINATION, TYPE 2 DM, ENCOUNTER FOR (HCC): ICD-10-CM

## 2021-04-29 DIAGNOSIS — Z23 ENCOUNTER FOR IMMUNIZATION: ICD-10-CM

## 2021-04-29 DIAGNOSIS — G89.29 OTHER CHRONIC PAIN: ICD-10-CM

## 2021-04-29 DIAGNOSIS — M06.9 RHEUMATOID ARTHRITIS, INVOLVING UNSPECIFIED SITE, UNSPECIFIED WHETHER RHEUMATOID FACTOR PRESENT (HCC): ICD-10-CM

## 2021-04-29 DIAGNOSIS — I10 ESSENTIAL HYPERTENSION: Primary | ICD-10-CM

## 2021-04-29 DIAGNOSIS — G47.00 INSOMNIA, UNSPECIFIED TYPE: ICD-10-CM

## 2021-04-29 PROCEDURE — G8427 DOCREV CUR MEDS BY ELIG CLIN: HCPCS | Performed by: NURSE PRACTITIONER

## 2021-04-29 PROCEDURE — 3044F HG A1C LEVEL LT 7.0%: CPT | Performed by: NURSE PRACTITIONER

## 2021-04-29 PROCEDURE — G8420 CALC BMI NORM PARAMETERS: HCPCS | Performed by: NURSE PRACTITIONER

## 2021-04-29 PROCEDURE — 1090F PRES/ABSN URINE INCON ASSESS: CPT | Performed by: NURSE PRACTITIONER

## 2021-04-29 PROCEDURE — G8536 NO DOC ELDER MAL SCRN: HCPCS | Performed by: NURSE PRACTITIONER

## 2021-04-29 PROCEDURE — 3017F COLORECTAL CA SCREEN DOC REV: CPT | Performed by: NURSE PRACTITIONER

## 2021-04-29 PROCEDURE — 99214 OFFICE O/P EST MOD 30 MIN: CPT | Performed by: NURSE PRACTITIONER

## 2021-04-29 PROCEDURE — G8432 DEP SCR NOT DOC, RNG: HCPCS | Performed by: NURSE PRACTITIONER

## 2021-04-29 PROCEDURE — 2022F DILAT RTA XM EVC RTNOPTHY: CPT | Performed by: NURSE PRACTITIONER

## 2021-04-29 PROCEDURE — G9899 SCRN MAM PERF RSLTS DOC: HCPCS | Performed by: NURSE PRACTITIONER

## 2021-04-29 PROCEDURE — 1101F PT FALLS ASSESS-DOCD LE1/YR: CPT | Performed by: NURSE PRACTITIONER

## 2021-04-29 PROCEDURE — G8752 SYS BP LESS 140: HCPCS | Performed by: NURSE PRACTITIONER

## 2021-04-29 PROCEDURE — G8399 PT W/DXA RESULTS DOCUMENT: HCPCS | Performed by: NURSE PRACTITIONER

## 2021-04-29 PROCEDURE — G8754 DIAS BP LESS 90: HCPCS | Performed by: NURSE PRACTITIONER

## 2021-04-29 RX ORDER — ZOLPIDEM TARTRATE 6.25 MG/1
TABLET, FILM COATED, EXTENDED RELEASE ORAL
Qty: 30 TAB | Refills: 0 | Status: SHIPPED | OUTPATIENT
Start: 2021-04-29 | End: 2021-09-29

## 2021-04-29 RX ORDER — TRAMADOL HYDROCHLORIDE 50 MG/1
50 TABLET ORAL
Qty: 120 TAB | Refills: 0 | Status: SHIPPED | OUTPATIENT
Start: 2021-04-29 | End: 2021-05-13

## 2021-04-29 NOTE — PROGRESS NOTES
Marco A Fernandez presents today for No chief complaint on file. Is someone accompanying this pt? no    Is the patient using any DME equipment during [address]? no    Depression Screening:  3 most recent PHQ Screens 1/28/2021   PHQ Not Done -   Little interest or pleasure in doing things Not at all   Feeling down, depressed, irritable, or hopeless Not at all   Total Score PHQ 2 0       Learning Assessment:  Learning Assessment 8/1/2014   PRIMARY LEARNER Patient   CO-LEARNER CAREGIVER No   PRIMARY LANGUAGE ENGLISH   LEARNER PREFERENCE PRIMARY READING   ANSWERED BY patient   RELATIONSHIP SELF       Abuse Screening:  Abuse Screening Questionnaire 2/3/2020   Do you ever feel afraid of your partner? N   Are you in a relationship with someone who physically or mentally threatens you? N   Is it safe for you to go home? Y       Fall Risk  Fall Risk Assessment, last 12 mths 2/3/2020   Able to walk? Yes   Fall in past 12 months? No       ADL  No flowsheet data found. Health Maintenance reviewed and discussed and ordered per Provider. Health Maintenance Due   Topic Date Due    Eye Exam Retinal or Dilated  Never done    COVID-19 Vaccine (1) Never done    Shingrix Vaccine Age 50> (1 of 2) Never done    PAP AKA CERVICAL CYTOLOGY  11/24/2015    Bone Densitometry (Dexa) Screening  11/12/2019    Foot Exam Q1  02/03/2021    Breast Cancer Screen Mammogram  04/01/2021   . Coordination of Care:  1. Have you been to the ER, urgent care clinic since your last visit?no Hospitalized since your last visit?no    2. Have you seen or consulted any other health care providers outside of the [address] [place] [address] since your last visit?no Include any pap smears or colon screening.

## 2021-04-29 NOTE — PROGRESS NOTES
Office Note        Patient: Arlene Webb [id number]     Subjective:     Diana Alcazar is a 76 y.o. BLACK/ female who was seen in clinic today (4/29/2021) for evaluation of Follow Up Chronic Condition (pain)  . Diabetes well controlled, Hypertension well controlled, Hyperlipidemia well controlled. Hypertension/HLD:    Diet and Lifestyle: generally follows a low sodium diet  Home BP Monitoring: is not measured at home    Cardiovascular ROS: taking medications as instructed, no medication side effects noted, no TIA's, no chest pain on exertion, no dyspnea on exertion, no swelling of ankles.      Lab Results   Component Value Date/Time    Sodium 143 04/22/2021 08:55 AM    Potassium 4.6 04/22/2021 08:55 AM    Chloride 103 04/22/2021 08:55 AM    CO2 25 04/22/2021 08:55 AM    Anion gap 6 02/03/2020 03:02 PM    Glucose 99 04/22/2021 08:55 AM    BUN 18 04/22/2021 08:55 AM    Creatinine 0.88 04/22/2021 08:55 AM    BUN/Creatinine ratio 20 04/22/2021 08:55 AM    GFR est AA 79 04/22/2021 08:55 AM    GFR est non-AA 69 04/22/2021 08:55 AM    Calcium 10.3 04/22/2021 08:55 AM      Lab Results   Component Value Date/Time    WBC 6.8 12/22/2016 12:32 PM    HGB 11.2 (L) 12/22/2016 12:32 PM    HCT 34.6 (L) 12/22/2016 12:32 PM    PLATELET 842 46/10/4949 12:32 PM    MCV 84.2 12/22/2016 12:32 PM      Lab Results   Component Value Date/Time    Hemoglobin A1c 6.2 (H) 04/22/2021 08:55 AM      Lab Results   Component Value Date/Time    Cholesterol, total 104 04/22/2021 08:55 AM    HDL Cholesterol 58 04/22/2021 08:55 AM    LDL, calculated 29 04/22/2021 08:55 AM    LDL, calculated 76 02/03/2020 03:02 PM    VLDL, calculated 17 04/22/2021 08:55 AM    VLDL, calculated 16 02/03/2020 03:02 PM    Triglyceride 86 04/22/2021 08:55 AM    CHOL/HDL Ratio 2.3 02/03/2020 03:02 PM        Key Antihyperlipidemia Meds             atorvastatin (LIPITOR) 20 mg tablet (Taking) Take 1 tablet by mouth every day           New concerns: Pt was seeing Cardiology for her heart murmur but has not followed-up in years. .     Diabetes:    Since last visit, she  reports: no significant changes. She reports medication compliance: compliant most of the time. Medication side effects: none. Diabetic diet compliance: compliant most of the time   Activity level:not active    Home glucoses: Not Checking BS  Hypoglycemic episodes: N/A    Diabetic foot exam:     Left Foot:   Visual Exam: normal    Pulse DP: 2+ (normal)   Filament test: normal sensation    Vibratory sensation: normal      Right Foot:   Visual Exam: normal    Pulse DP: 2+ (normal)   Filament test: normal sensation    Vibratory sensation: normal      Diabetic Foot and Eye Exam HM Status   Topic Date Due    Eye Exam  Never done    Diabetic Foot Care  04/29/2022           Objective:     Visit Vitals  /67 (BP 1 Location: Right arm, BP Patient Position: Sitting, BP Cuff Size: Adult)   Pulse 75   Temp 97.2 °F (36.2 °C) (Temporal)   Resp 18   Ht 5' 10\" (1.778 m)   Wt 142 lb (64.4 kg)   LMP 01/20/2007   SpO2 98%   BMI 20.37 kg/m²       Appearance: alert, well appearing, and in no distress and oriented to person, place, and time. Exam: heart sounds S1 and S2 normal, systolic murmur, chest clear, no carotid bruits, PVC's, PAC's audible. Lab review: labs are reviewed, up to date. Assessment & Plan:     diabetes well controlled, hypertension well controlled, hyperlipidemia well controlled. Diabetic issues reviewed with her: home glucose monitoring emphasized, foot care discussed and Podiatry visits discussed and annual eye examinations at Ophthalmology discussed. Hypertension issues reviewed with her: Continue current therapy         ICD-10-CM ICD-9-CM    1. Essential hypertension  I10 401.9    2. Type 2 diabetes mellitus without complication, without long-term current use of insulin (HCC)  E11.9 250.00    3. Heart murmur  R01.1 785.2 REFERRAL TO CARDIOLOGY   4.  Rheumatoid arthritis, involving unspecified site, unspecified whether rheumatoid factor present ([facility]  M06.9 714.0    5. Other chronic pain  G89.29 338.29 traMADoL (ULTRAM) 50 mg tablet   6. Insomnia, unspecified type  G47.00 780.52 zolpidem CR (AMBIEN CR) 6.25 mg tablet   7. Comprehensive diabetic foot examination, type 2 DM, encounter for ([facility]  E11.9 250.00  DIABETES FOOT EXAM   8. Encounter for screening mammogram for malignant neoplasm of breast  Z12.31 V76.12 [facility] MAMMO BI SCREENING INCL CAD   9. Encounter for immunization  Z23 V03.89 varicella-zoster recombinant, PF, (SHINGRIX) 50 mcg/0.5 mL susr injection         Follow-up and Dispositions    · Return in about 6 months (around 10/29/2021) for Chronic Pain, Arthritis, Hypertension, High Cholesterol, Diabetes, (In Office). Disclaimer:    I have discussed the diagnosis with the patient and the intended plan as seen above. The patient understands our medical plan. The risks, benefits and significant side effects of all medications have been reviewed. Anticipated time course and progression of condition reviewed. All questions have been addressed. She received an after visit summary, with information reviewed, and questions answered. Where appropriate, she is instructed to call the clinic if she has not been notified either by phone or through [address] with the results of her tests or with an appointment plan for any referrals within 1 week(s). The patient  is to call if her condition worsens or fails to improve or if significant side effects are experienced.        Bhavna Carrillo NP

## 2021-04-29 NOTE — PATIENT INSTRUCTIONS
Vaccine Information Statement    Recombinant Zoster (Shingles) Vaccine: What You Need to Know    Many Vaccine Information Statements are available in [place] and other languages. See www.immunize.org/vis  Hojas de información sobre vacunas están disponibles en español y en muchos otros idiomas. Visite www.immunize.org/vis    1. Why get vaccinated? Recombinant zoster (shingles) vaccine can prevent shingles. Shingles (also called herpes zoster, or just zoster) is a painful skin rash, usually with blisters. In addition to the rash, shingles can cause fever, headache, chills, or upset stomach. More rarely, shingles can lead to pneumonia, hearing problems, blindness, brain inflammation (encephalitis), or death. The most common complication of shingles is long-term nerve pain called postherpetic neuralgia (PHN). PHN occurs in the areas where the shingles rash was, even after the rash clears up. It can last for months or years after the rash goes away. The pain from PHN can be severe and debilitating. About 10 to 18% of people who get shingles will experience PHN. The risk of PHN increases with age. An older adult with shingles is more likely to develop PHN and have longer lasting and more severe pain than a younger person with shingles. Shingles is caused by the varicella zoster virus, the same virus that causes chickenpox. After you have chickenpox, the virus stays in your body and can cause shingles later in life. Shingles cannot be passed from one person to another, but the virus that causes shingles can spread and cause chickenpox in someone who had never had chickenpox or received chickenpox vaccine. 2. Recombinant shingles vaccine    Recombinant shingles vaccine provides strong protection against shingles. By preventing shingles, recombinant shingles vaccine also protects against PHN. Recombinant shingles vaccine is the preferred vaccine for the prevention of shingles.   However, a different vaccine, live shingles vaccine, may be used in some circumstances. The recombinant shingles vaccine is recommended for adults 50 years and older without serious immune problems. It is given as a two-dose series. This vaccine is also recommended for people who have already gotten another type of shingles vaccine, the live shingles vaccine. There is no live virus in this vaccine. Shingles vaccine may be given at the same time as other vaccines. 3. Talk with your health care provider    Tell your vaccine provider if the person getting the vaccine:   Has had an allergic reaction after a previous dose of recombinant shingles vaccine, or has any severe, life-threatening allergies.  Is pregnant or breastfeeding.  Is currently experiencing an episode of shingles. In some cases, your health care provider may decide to postpone shingles vaccination to a future visit. People with minor illnesses, such as a cold, may be vaccinated. People who are moderately or severely ill should usually wait until they recover before getting recombinant shingles vaccine. Your health care provider can give you more information. 4. Risks of a vaccine reaction     A sore arm with mild or moderate pain is very common after recombinant shingles vaccine, affecting about 80% of vaccinated people. Redness and swelling can also happen at the site of the injection.  Tiredness, muscle pain, headache, shivering, fever, stomach pain, and nausea happen after vaccination in more than half of people who receive recombinant shingles vaccine. In clinical trials, about 1 out of 6 people who got recombinant zoster vaccine experienced side effects that prevented them from doing regular activities. Symptoms usually went away on their own in 2 to 3 days. You should still get the second dose of recombinant zoster vaccine even if you had one of these reactions after the first dose.       People sometimes faint after medical procedures, including vaccination. Tell your provider if you feel dizzy or have vision changes or ringing in the ears. As with any medicine, there is a very remote chance of a vaccine causing a severe allergic reaction, other serious injury, or death. 5. What if there is a serious problem? An allergic reaction could occur after the vaccinated person leaves the clinic. If you see signs of a severe allergic reaction (hives, swelling of the face and throat, difficulty breathing, a fast heartbeat, dizziness, or weakness), call 9-1-1 and get the person to the nearest hospital.    For other signs that concern you, call your health care provider. Adverse reactions should be reported to the Vaccine Adverse Event Reporting System (VAERS). Your health care provider will usually file this report, or you can do it yourself. Visit the VAERS website at www.vaers. [employer].gov or call 5-960.355.4530. VAERS is only for reporting reactions, and VAERS staff do not give medical advice. 6. How can I learn more?  Ask your health care provider.  Call your local or state health department.  Contact the Centers for Disease Control and Prevention (CDC):  - Call 0-702.455.2266 (1-800-CDC-INFO) or  - Visit CDCs website at www.cdc.gov/vaccines    Vaccine Information Statement   Recombinant Zoster Vaccine   10/30/2019    [facility] for Disease Control and Prevention    Office Use Only         Arthritis: Care Instructions  Overview     Arthritis, also called osteoarthritis, is a breakdown of the cartilage that cushions your joints. When the cartilage wears down, your bones rub against each other. This causes pain and stiffness. Many people have some arthritis as they age. Arthritis most often affects the joints of the spine, hands, hips, knees, or feet. Arthritis never goes away completely. But medicine and home treatment can help reduce pain and help you stay active.   Follow-up care is a key part of your treatment and safety. Be sure to make and go to all appointments, and call your doctor if you are having problems. It's also a good idea to know your test results and keep a list of the medicines you take. How can you care for yourself at home? · Stay at a healthy weight. Being overweight puts extra strain on your joints. · Talk to your doctor or physical therapist about exercises that will help ease joint pain. ? Stretch. You may enjoy gentle forms of yoga to help keep your joints and muscles flexible. ? Walk instead of jog. Other types of exercise that are less stressful on the joints include riding a bike, swimming, matt chi, or water exercise. ? Lift weights. Strong muscles help reduce stress on your joints. Stronger thigh muscles, for example, take some of the stress off of the knees and hips. Learn the right way to lift weights so you don't make joint pain worse. · Take your medicines exactly as prescribed. Call your doctor if you think you are having a problem with your medicine. · Take pain medicines exactly as directed. ? If the doctor gave you a prescription medicine for pain, take it as prescribed. ? If you are not taking a prescription pain medicine, ask your doctor if you can take an over-the-counter medicine. · Use a cane, crutch, walker, or another device if you need help to get around. These can help rest your joints. You also can use other things to make life easier, such as a higher toilet seat and padded handles on kitchen utensils. · Do not sit in low chairs. They can make it hard to get up. · Put heat or cold on your sore joints as needed. Use whichever helps you most. You can also switch between hot and cold packs. ? Apply heat 2 or 3 times a day for 20 to 30 minutesusing a heating pad, hot shower, or hot packto relieve pain and stiffness. But don't use heat on a swollen joint. ? Put ice or a cold pack on your sore joint for 10 to 20 minutes at a time. Put a thin cloth between the ice and your skin. When should you call for help? Call your doctor now or seek immediate medical care if:    · You have sudden swelling, warmth, or pain in any joint.     · You have joint pain and a fever or rash.     · You have such bad pain that you cannot use a joint. Watch closely for changes in your health, and be sure to contact your doctor if:    · You have mild joint symptoms that continue even with more than 6 weeks of care at home.     · You have stomach pain or other problems with your medicine. Where can you learn more? Go to http://www.gray.com/  Enter U279 in the search box to learn more about \"Arthritis: Care Instructions. \"  Current as of: August 5, 2020               Content Version: 12.8  © 2006-2021 [employer]. Care instructions adapted under license by [employer] (which disclaims liability or warranty for this information). If you have questions about a medical condition or this instruction, always ask your healthcare professional. [address] any warranty or liability for your use of this information.

## 2021-05-12 DIAGNOSIS — G89.29 OTHER CHRONIC PAIN: ICD-10-CM

## 2021-05-13 RX ORDER — TRAMADOL HYDROCHLORIDE 50 MG/1
TABLET ORAL
Qty: 120 TAB | Refills: 0 | Status: SHIPPED | OUTPATIENT
Start: 2021-05-13 | End: 2021-06-14

## 2021-05-20 ENCOUNTER — OFFICE VISIT (OUTPATIENT)
Dept: CARDIOLOGY CLINIC | Age: 66
End: 2021-05-20
Payer: MEDICARE

## 2021-05-20 VITALS
BODY MASS INDEX: 20.76 KG/M2 | OXYGEN SATURATION: 97 % | DIASTOLIC BLOOD PRESSURE: 62 MMHG | WEIGHT: 145 LBS | SYSTOLIC BLOOD PRESSURE: 100 MMHG | HEIGHT: 70 IN | HEART RATE: 71 BPM

## 2021-05-20 DIAGNOSIS — I10 ESSENTIAL HYPERTENSION: ICD-10-CM

## 2021-05-20 DIAGNOSIS — R01.1 HEART MURMUR: Primary | ICD-10-CM

## 2021-05-20 DIAGNOSIS — R01.1 HEART MURMUR: ICD-10-CM

## 2021-05-20 DIAGNOSIS — E11.21 TYPE 2 DIABETES WITH NEPHROPATHY (HCC): ICD-10-CM

## 2021-05-20 PROCEDURE — G8752 SYS BP LESS 140: HCPCS | Performed by: INTERNAL MEDICINE

## 2021-05-20 PROCEDURE — G8399 PT W/DXA RESULTS DOCUMENT: HCPCS | Performed by: INTERNAL MEDICINE

## 2021-05-20 PROCEDURE — G8432 DEP SCR NOT DOC, RNG: HCPCS | Performed by: INTERNAL MEDICINE

## 2021-05-20 PROCEDURE — G9899 SCRN MAM PERF RSLTS DOC: HCPCS | Performed by: INTERNAL MEDICINE

## 2021-05-20 PROCEDURE — 3017F COLORECTAL CA SCREEN DOC REV: CPT | Performed by: INTERNAL MEDICINE

## 2021-05-20 PROCEDURE — G8536 NO DOC ELDER MAL SCRN: HCPCS | Performed by: INTERNAL MEDICINE

## 2021-05-20 PROCEDURE — 1090F PRES/ABSN URINE INCON ASSESS: CPT | Performed by: INTERNAL MEDICINE

## 2021-05-20 PROCEDURE — 3044F HG A1C LEVEL LT 7.0%: CPT | Performed by: INTERNAL MEDICINE

## 2021-05-20 PROCEDURE — 99204 OFFICE O/P NEW MOD 45 MIN: CPT | Performed by: INTERNAL MEDICINE

## 2021-05-20 PROCEDURE — 2022F DILAT RTA XM EVC RTNOPTHY: CPT | Performed by: INTERNAL MEDICINE

## 2021-05-20 PROCEDURE — G8427 DOCREV CUR MEDS BY ELIG CLIN: HCPCS | Performed by: INTERNAL MEDICINE

## 2021-05-20 PROCEDURE — G8420 CALC BMI NORM PARAMETERS: HCPCS | Performed by: INTERNAL MEDICINE

## 2021-05-20 PROCEDURE — G8754 DIAS BP LESS 90: HCPCS | Performed by: INTERNAL MEDICINE

## 2021-05-20 PROCEDURE — 1101F PT FALLS ASSESS-DOCD LE1/YR: CPT | Performed by: INTERNAL MEDICINE

## 2021-05-20 PROCEDURE — 93000 ELECTROCARDIOGRAM COMPLETE: CPT | Performed by: INTERNAL MEDICINE

## 2021-05-20 NOTE — PROGRESS NOTES
HISTORY OF PRESENT ILLNESS  Stephen Elam is a 76 y.o. female. Hypertension  Pertinent negatives include no chest pain, no abdominal pain, no headaches and no shortness of breath. New Patient  Pertinent negatives include no chest pain, no abdominal pain, no headaches and no shortness of breath. Patient presents for a new office visit. The patient was referred for evaluation of a cardiac murmur. Patient also has a history of essential hypertension, type 2 diabetes and dyslipidemia. The patient last underwent an exercise nuclear stress test and an echocardiogram in August 2014, both of which were normal studies. She He did not have any significant valvular heart disease. She also underwent a Holter monitor which showed occasional PVCs, but no other arrhythmias and was otherwise normal.    Patient denies any heart palpitations, dizziness, nor syncope. She is never experienced any orthopnea, PND, leg swelling or claudication. No chest pain or pressure. No major change in her activity tolerance. Past Medical History:   Diagnosis Date    Arthritis     Arthritis 12/19/2011    Heart murmur 12/19/2011    Heart murmur     History of echocardiogram 08/12/2014    EF 55-60%. No WMA.  History of seasonal allergies 12/19/2011    HTN (hypertension) 12/19/2011    Hypertension     Iron deficiency anemia 9/27/2012    Normal myocardial perfusion study 08/12/2014    No ischemia or prior infarction. Hyperdynamic EF >70%. No RWMA. Neg EKG on EST. Ex time 6 min 11 sec.  Seasonal allergies       Current Outpatient Medications   Medication Sig Dispense Refill    traMADoL (ULTRAM) 50 mg tablet Take 1 tablet by mouth every 6 hours as needed for pain.  Max 4 tabs/day 120 Tab 0    zolpidem CR (AMBIEN CR) 6.25 mg tablet Take 1 tablet by mouth every evening as needed for sleep 30 Tab 0    atorvastatin (LIPITOR) 20 mg tablet Take 1 tablet by mouth every day 90 Tab 0    PARoxetine (PAXIL) 20 mg tablet Take 1 tablet by mouth every day 90 Tab 0    valsartan-hydroCHLOROthiazide (DIOVAN-HCT) 80-12.5 mg per tablet Take 1 tablet by mouth every day 90 Tab 0    aspirin 81 mg chewable tablet Take 1 tablet by mouth every day 90 Tab 1    flash glucose scanning reader ([employer] [place]) misc CHECK BLOOD SUGAR 2 TIMES PER DAY. 1 TIME \"FASTING\" BEFORE BREAKFAST, AND 1 TIME AFTER A MEAL. 1 Each 0    flash glucose sensor ([employer] You 2 Sensor) kit CHECK BLOOD SUGAR 2 TIMES PER DAY. 1 TIME \"FASTING\" BEFORE BREAKFAST, AND 1 TIME AFTER A MEAL. 1 Kit 0    fluticasone propionate (FLONASE) 50 mcg/actuation nasal spray Use 2 sprays into each nostril every day as needed for runny nose/allergies 1 Bottle 11    metFORMIN ER (GLUCOPHAGE XR) 500 mg tablet Take 1 Tab by mouth two (2) times a day. 180 Tab 0    meloxicam (MOBIC) 15 mg tablet Take with food daily 90 Tab 2    FEXOFENADINE HCL (ALLEGRA PO) Take 180 mg by mouth daily as needed. Allergies   Allergen Reactions    Grass Pollen Runny Nose and Sneezing        Social History     Tobacco Use    Smoking status: Never Smoker    Smokeless tobacco: Never Used   Substance Use Topics    Alcohol use: No    Drug use: No            Review of Systems   Constitutional: Negative for chills, fever and weight loss. HENT: Negative for nosebleeds. Eyes: Negative for blurred vision and double vision. Respiratory: Negative for cough, shortness of breath and wheezing. Cardiovascular: Negative for chest pain, palpitations, orthopnea, claudication, leg swelling and PND. Gastrointestinal: Negative for abdominal pain, heartburn, nausea and vomiting. Genitourinary: Negative for dysuria and hematuria. Musculoskeletal: Negative for falls and myalgias. Skin: Negative for rash. Neurological: Negative for dizziness, focal weakness and headaches. Endo/Heme/Allergies: Does not bruise/bleed easily. Psychiatric/Behavioral: Negative for substance abuse. Visit Vitals  /62 (BP 1 Location: Left upper arm, BP Patient Position: Sitting, BP Cuff Size: Adult)   Pulse 71   Ht 5' 10\" (1.778 m)   Wt 65.8 kg (145 lb)   LMP 01/20/2007   SpO2 97%   BMI 20.81 kg/m²       Physical Exam  Constitutional:       Appearance: She is well-developed. HENT:      Head: Normocephalic and atraumatic. Eyes:      Conjunctiva/sclera: Conjunctivae normal.   Neck:      Vascular: No carotid bruit or JVD. Cardiovascular:      Rate and Rhythm: Normal rate and regular rhythm. No extrasystoles are present. Pulses: Normal pulses. Heart sounds: S1 normal and S2 normal. Murmur heard. Midsystolic murmur is present with a grade of 2/6 at the upper left sternal border. No gallop. Pulmonary:      Effort: Pulmonary effort is normal.      Breath sounds: Normal breath sounds. No wheezing or rales. Abdominal:      General: Bowel sounds are normal.      Palpations: Abdomen is soft. Tenderness: There is no abdominal tenderness. Musculoskeletal:         General: No swelling, tenderness or deformity. Cervical back: Neck supple. Skin:     General: Skin is warm and dry. Neurological:      General: No focal deficit present. Mental Status: She is alert and oriented to person, place, and time. Sensory: No sensory deficit. Motor: No weakness. Psychiatric:         Mood and Affect: Mood normal.       EKG: Normal sinus rhythm, normal axis, normal QTc interval, no ST or T wave abnormalities concerning for ischemia. Compared to previous EKG, no significant change. ASSESSMENT and PLAN    Cardiac murmur. Patient has a soft midsystolic heart murmur on exam today. This was documented many years ago. She had an echocardiogram back in 2014 which was unremarkable. Since she has not had a follow-up study in over 5 years, I would recommend repeating an echocardiogram.  If this is unremarkable, no additional work-up needed at this time.     Premature ventricular contractions. This has been documented in the past on a Holter monitor. She remains asymptomatic. As long as her heart function is normal on her echocardiogram, no additional work-up or treatment needed. Essential hypertension. Patient's blood pressure remains controlled on the valsartan/HCTZ, which I would continue. Diabetes mellitus, type II. Patient has been treating this with oral agents. Is followed closely by her PCP. From a cardiac standpoint for hemoglobin A1c be less than 7. As long as her echocardiogram is unremarkable, the patient can followup in 12 months, sooner if needed.

## 2021-05-20 NOTE — PROGRESS NOTES
Gisselle Kraft presents today for   Chief Complaint   Patient presents with    New Patient     rederred by PCP for heart murmur       Gisselle Kraft preferred language for health care discussion is english/other. Is someone accompanying this pt? no    Is the patient using any DME equipment during [address]? no    Depression Screening:  3 most recent PHQ Screens 4/29/2021   PHQ Not Done -   Little interest or pleasure in doing things Not at all   Feeling down, depressed, irritable, or hopeless Not at all   Total Score PHQ 2 0       Learning Assessment:  Learning Assessment 8/1/2014   PRIMARY LEARNER Patient   CO-LEARNER CAREGIVER No   PRIMARY LANGUAGE ENGLISH   LEARNER PREFERENCE PRIMARY READING   ANSWERED BY patient   RELATIONSHIP SELF       Abuse Screening:  Abuse Screening Questionnaire 4/29/2021   Do you ever feel afraid of your partner? N   Are you in a relationship with someone who physically or mentally threatens you? N   Is it safe for you to go home? Y       Fall Risk  Fall Risk Assessment, last 12 mths 4/29/2021   Able to walk? Yes   Fall in past 12 months? 0   Do you feel unsteady? 0   Are you worried about falling 0       Pt currently taking Anticoagulant therapy? ASA 81mg every day     Coordination of Care:  1. Have you been to the ER, urgent care clinic since your last visit? Hospitalized since your last visit? no    2. Have you seen or consulted any other health care providers outside of the [address] [place] [address] since your last visit? Include any pap smears or colon screening.  no

## 2021-06-11 DIAGNOSIS — G89.29 OTHER CHRONIC PAIN: ICD-10-CM

## 2021-06-14 RX ORDER — TRAMADOL HYDROCHLORIDE 50 MG/1
TABLET ORAL
Qty: 120 TABLET | Refills: 0 | Status: SHIPPED | OUTPATIENT
Start: 2021-06-14 | End: 2021-07-12

## 2021-06-22 LAB
ECHO AO ROOT DIAM: 2.8 CM
ECHO LV E' LATERAL VELOCITY: 11.25 CM/S
ECHO LV E' SEPTAL VELOCITY: 10.42 CM/S
ECHO LV INTERNAL DIMENSION DIASTOLIC: 3.68 CM (ref 3.9–5.3)
ECHO LV INTERNAL DIMENSION SYSTOLIC: 2.18 CM
ECHO LV IVSD: 1 CM (ref 0.6–0.9)
ECHO LV MASS 2D: 110.8 G (ref 67–162)
ECHO LV MASS INDEX 2D: 60.9 G/M2 (ref 43–95)
ECHO LV POSTERIOR WALL DIASTOLIC: 0.99 CM (ref 0.6–0.9)
ECHO LVOT CARDIAC OUTPUT: 3.65 LITER/MINUTE
ECHO LVOT DIAM: 1.79 CM
ECHO LVOT PEAK GRADIENT: 3.21 MMHG
ECHO LVOT PEAK VELOCITY: 89.63 CM/S
ECHO LVOT SV: 51.8 ML
ECHO LVOT VTI: 20.49 CM
ECHO MV A VELOCITY: 82.11 CM/S
ECHO MV E DECELERATION TIME (DT): 174.56 MS
ECHO MV E VELOCITY: 93.77 CM/S
ECHO MV E/A RATIO: 1.14
ECHO MV E/E' LATERAL: 8.34
ECHO MV E/E' RATIO (AVERAGED): 8.67
ECHO MV E/E' SEPTAL: 9
ECHO PVEIN A DURATION: 94.4 MS
ECHO PVEIN A VELOCITY: 39.36 CM/S
ECHO RV TAPSE: 2.57 CM (ref 1.5–2)
ECHO TV REGURGITANT MAX VELOCITY: 265.11 CM/S
ECHO TV REGURGITANT PEAK GRADIENT: 28.11 MMHG
LVOT MG: 1.73 MMHG

## 2021-06-23 NOTE — PROGRESS NOTES
Per your last note -    Cardiac murmur. Patient has a soft midsystolic heart murmur on exam today. This was documented many years ago. She had an echocardiogram back in 2014 which was unremarkable. Since she has not had a follow-up study in over 5 years, I would recommend repeating an echocardiogram.  If this is unremarkable, no additional work-up needed at this time.

## 2021-06-24 ENCOUNTER — TELEPHONE (OUTPATIENT)
Dept: CARDIOLOGY CLINIC | Age: 66
End: 2021-06-24

## 2021-06-24 NOTE — TELEPHONE ENCOUNTER
----- Message from Jessee Judge MD sent at 6/23/2021  4:57 PM EDT -----  Please let the patient know that his echocardiogram was unremarkable. He has no significant valvular heart disease.  ----- Message -----  From: Stephanie Briones LPN  Sent: 4/09/2566   9:08 AM EDT  To: Jessee Judge MD    Per your last note -    Cardiac murmur. Patient has a soft midsystolic heart murmur on exam today. This was documented many years ago. She had an echocardiogram back in 2014 which was unremarkable. Since she has not had a follow-up study in over 5 years, I would recommend repeating an echocardiogram.  If this is unremarkable, no additional work-up needed at this time.

## 2021-08-10 DIAGNOSIS — G89.29 OTHER CHRONIC PAIN: ICD-10-CM

## 2021-08-11 RX ORDER — TRAMADOL HYDROCHLORIDE 50 MG/1
TABLET ORAL
Qty: 120 TABLET | Refills: 0 | Status: SHIPPED | OUTPATIENT
Start: 2021-08-11 | End: 2021-09-10

## 2021-09-09 DIAGNOSIS — E11.21 TYPE 2 DIABETES WITH NEPHROPATHY (HCC): ICD-10-CM

## 2021-09-09 DIAGNOSIS — G89.29 OTHER CHRONIC PAIN: ICD-10-CM

## 2021-09-10 RX ORDER — TRAMADOL HYDROCHLORIDE 50 MG/1
TABLET ORAL
Qty: 120 TABLET | Refills: 5 | Status: SHIPPED | OUTPATIENT
Start: 2021-09-10 | End: 2021-10-10

## 2021-09-10 RX ORDER — GUAIFENESIN 100 MG/5ML
LIQUID (ML) ORAL
Qty: 30 TABLET | Refills: 11 | Status: SHIPPED | OUTPATIENT
Start: 2021-09-10 | End: 2022-07-13

## 2021-11-01 ENCOUNTER — TELEPHONE (OUTPATIENT)
Dept: FAMILY MEDICINE CLINIC | Age: 66
End: 2021-11-01

## 2021-11-29 DIAGNOSIS — M06.9 RHEUMATOID ARTHRITIS INVOLVING MULTIPLE SITES, UNSPECIFIED WHETHER RHEUMATOID FACTOR PRESENT (HCC): ICD-10-CM

## 2021-11-29 RX ORDER — FLUTICASONE PROPIONATE 50 MCG
SPRAY, SUSPENSION (ML) NASAL
Qty: 1 EACH | Refills: 11 | Status: SHIPPED | OUTPATIENT
Start: 2021-11-29 | End: 2022-09-30

## 2021-11-29 RX ORDER — MELOXICAM 15 MG/1
TABLET ORAL
Qty: 30 TABLET | Refills: 11 | Status: SHIPPED | OUTPATIENT
Start: 2021-11-29 | End: 2022-09-30

## 2022-01-04 ENCOUNTER — TELEPHONE (OUTPATIENT)
Dept: FAMILY MEDICINE CLINIC | Age: 67
End: 2022-01-04

## 2022-01-05 ENCOUNTER — TELEPHONE (OUTPATIENT)
Dept: MAMMOGRAPHY | Age: 67
End: 2022-01-05

## 2022-01-05 ENCOUNTER — PATIENT MESSAGE (OUTPATIENT)
Dept: FAMILY MEDICINE CLINIC | Age: 67
End: 2022-01-05

## 2022-01-05 ENCOUNTER — VIRTUAL VISIT (OUTPATIENT)
Dept: FAMILY MEDICINE CLINIC | Age: 67
End: 2022-01-05
Payer: MEDICAID

## 2022-01-05 DIAGNOSIS — E11.21 TYPE 2 DIABETES WITH NEPHROPATHY (HCC): ICD-10-CM

## 2022-01-05 DIAGNOSIS — M06.9 RHEUMATOID ARTHRITIS, INVOLVING UNSPECIFIED SITE, UNSPECIFIED WHETHER RHEUMATOID FACTOR PRESENT (HCC): ICD-10-CM

## 2022-01-05 DIAGNOSIS — E78.5 HYPERLIPIDEMIA, UNSPECIFIED HYPERLIPIDEMIA TYPE: ICD-10-CM

## 2022-01-05 DIAGNOSIS — Z13.21 ENCOUNTER FOR VITAMIN DEFICIENCY SCREENING: ICD-10-CM

## 2022-01-05 DIAGNOSIS — G47.00 INSOMNIA, UNSPECIFIED TYPE: ICD-10-CM

## 2022-01-05 DIAGNOSIS — I10 ESSENTIAL HYPERTENSION: Primary | ICD-10-CM

## 2022-01-05 PROCEDURE — 99214 OFFICE O/P EST MOD 30 MIN: CPT | Performed by: NURSE PRACTITIONER

## 2022-01-05 RX ORDER — ZOLPIDEM TARTRATE 5 MG/1
5 TABLET ORAL
Qty: 90 TABLET | Refills: 3 | Status: SHIPPED | OUTPATIENT
Start: 2022-01-05 | End: 2022-06-06

## 2022-01-05 NOTE — PROGRESS NOTES
Jesus Goyal is a 78 y.o. female who was seen by synchronous (real-time) audio-video technology on 1/5/2022 for No chief complaint on file. HTN, DM, HLD Review:     Diabetes stable, Hypertension stable, Hyperlipidemia control uncertain. Hypertension/HLD:    Diet and Lifestyle: not attempting to follow a low sodium diet, does not rigorously follow a diabetic diet  Home BP Monitoring: is well controlled at home, ranging 120's/60's-80's    Cardiovascular ROS: taking medications as instructed, no medication side effects noted, no TIA's, no chest pain on exertion, no dyspnea on exertion, no swelling of ankles.      Lab Results   Component Value Date/Time    Sodium 143 04/22/2021 08:55 AM    Potassium 4.6 04/22/2021 08:55 AM    Chloride 103 04/22/2021 08:55 AM    CO2 25 04/22/2021 08:55 AM    Anion gap 6 02/03/2020 03:02 PM    Glucose 99 04/22/2021 08:55 AM    BUN 18 04/22/2021 08:55 AM    Creatinine 0.88 04/22/2021 08:55 AM    BUN/Creatinine ratio 20 04/22/2021 08:55 AM    GFR est AA 79 04/22/2021 08:55 AM    GFR est non-AA 69 04/22/2021 08:55 AM    Calcium 10.3 04/22/2021 08:55 AM      Lab Results   Component Value Date/Time    WBC 6.8 12/22/2016 12:32 PM    HGB 11.2 (L) 12/22/2016 12:32 PM    HCT 34.6 (L) 12/22/2016 12:32 PM    PLATELET 643 78/80/9334 12:32 PM    MCV 84.2 12/22/2016 12:32 PM      Lab Results   Component Value Date/Time    Hemoglobin A1c 6.2 (H) 04/22/2021 08:55 AM      Lab Results   Component Value Date/Time    Cholesterol, total 104 04/22/2021 08:55 AM    HDL Cholesterol 58 04/22/2021 08:55 AM    LDL, calculated 29 04/22/2021 08:55 AM    LDL, calculated 76 02/03/2020 03:02 PM    VLDL, calculated 17 04/22/2021 08:55 AM    VLDL, calculated 16 02/03/2020 03:02 PM    Triglyceride 86 04/22/2021 08:55 AM    CHOL/HDL Ratio 2.3 02/03/2020 03:02 PM        Key Antihyperlipidemia Meds             atorvastatin (LIPITOR) 20 mg tablet (Taking) Take 1 tablet by mouth every day           New concerns: Pt states that her insurance will not pay for the extended release Ambien and she needs a regular dose to get them to cover it. Patient is also not seen anyone for her rheumatoid arthritis, and has been maintaining her symptoms with current medication. Diabetes:    Since last visit, she  reports: no significant changes. She reports medication compliance: compliant most of the time. Medication side effects: none. Diabetic diet compliance: compliant most of the time   Activity level:Walks the neighborhood 2 times a week     Home glucoses: Pt is checking her BS 1 time per day fasting. Fastin's-80's  Hypoglycemic episodes: N/A    Diabetic Foot and Eye Exam HM Status   Topic Date Due    Eye Exam  Never done    Diabetic Foot Care  2022         Assessment & Plan:     Diagnoses and all orders for this visit:    1. Essential hypertension  -     zolpidem (AMBIEN) 5 mg tablet; Take 1 Tablet by mouth nightly as needed for Sleep. Max Daily Amount: 5 mg.  -     METABOLIC PANEL, BASIC; Future    2. Rheumatoid arthritis, involving unspecified site, unspecified whether rheumatoid factor present ([facility] [address]    3. Type 2 diabetes with nephropathy (HCC)  -     HEMOGLOBIN A1C WITH EAG; Future  -     MICROALBUMIN, UR, RAND W/ MICROALB/CREAT RATIO; Future    4. Insomnia, unspecified type  -     zolpidem (AMBIEN) 5 mg tablet; Take 1 Tablet by mouth nightly as needed for Sleep. Max Daily Amount: 5 mg.    5. Hyperlipidemia, unspecified hyperlipidemia type  -     LIPID PANEL; Future  -     MICROALBUMIN, UR, RAND W/ MICROALB/CREAT RATIO; Future    6. Encounter for vitamin deficiency screening  -     VITAMIN D, 25 HYDROXY; Future      Follow-up and Dispositions    · Return in about 6 months (around 2022) for Hypertension, High Cholesterol, Diabetes, (In Office), Labs 1 Week Prior. Routing History         Subjective:       Prior to Admission medications    Medication Sig Start Date End Date Taking?  Authorizing Provider zolpidem (AMBIEN) 5 mg tablet Take 1 Tablet by mouth nightly as needed for Sleep. Max Daily Amount: 5 mg. 1/5/22  Yes Cesario Salguero NP   fluticasone propionate (FLONASE) 50 mcg/actuation nasal spray Use 2 sprays into each nostril every day as needed for runny nose/allergies 11/29/21  Yes Cesario Salguero NP   meloxicam (MOBIC) 15 mg tablet Take 1 tablet by mouth every day with food 11/29/21  Yes Cesario Salguero NP   aspirin 81 mg chewable tablet Take 1 tablet by mouth every day 9/10/21  Yes Cesario Salguero NP   atorvastatin (LIPITOR) 20 mg tablet Take 1 tablet by mouth every day 7/12/21  Yes Cesario Salguero NP   valsartan-hydroCHLOROthiazide (DIOVAN-HCT) 80-12.5 mg per tablet Take 1 tablet by mouth every day 7/12/21  Yes Cesario Salguero NP   PARoxetine (PAXIL) 20 mg tablet Take 1 tablet by mouth every day 7/12/21  Yes Cesario Salguero NP   metFORMIN ER (GLUCOPHAGE XR) 500 mg tablet Take 1 tablet by mouth twice daily 7/12/21  Yes Cesario Salguero NP   flash glucose scanning reader ([employer] [place]) misc CHECK BLOOD SUGAR 2 TIMES PER DAY. 1 TIME \"FASTING\" BEFORE BREAKFAST, AND 1 TIME AFTER A MEAL. 3/1/21  Yes Cesario Salguero NP   flash glucose sensor (FreeStyle [place] 2 Sensor) kit CHECK BLOOD SUGAR 2 TIMES PER DAY. 1 TIME \"FASTING\" BEFORE BREAKFAST, AND 1 TIME AFTER A MEAL. 3/1/21  Yes Cesario Salguero NP   FEXOFENADINE HCL (ALLEGRA PO) Take 180 mg by mouth daily as needed. Yes Provider, Historical   zolpidem CR (AMBIEN CR) 6.25 mg tablet Take 1 tablet by mouth every evening as needed for sleep 9/29/21 1/5/22  Cesario Salguero NP     ROS    Objective:   No flowsheet data found.    General: alert, cooperative, no distress   Mental  status: normal mood, behavior, speech, dress, motor activity, and thought processes, able to follow commands   HENT: NCAT   Neck: no visualized mass   Resp: no respiratory distress   Neuro: no gross deficits   Skin: no discoloration or lesions of concern on visible areas   Psychiatric: normal affect, consistent with stated mood, no evidence of hallucinations     Additional exam findings: N/A      We discussed the expected course, resolution and complications of the diagnosis(es) in detail. Medication risks, benefits, costs, interactions, and alternatives were discussed as indicated. I advised her to contact the office if her condition worsens, changes or fails to improve as anticipated. She expressed understanding with the diagnosis(es) and plan. Frankie Dave, who was evaluated through a patient-initiated, synchronous (real-time) audio-video encounter, and/or her healthcare decision maker, is aware that it is a billable service, with coverage as determined by her insurance carrier. She provided verbal consent to proceed: Yes, and patient identification was verified. It was conducted pursuant to the emergency declaration under the [address] [place] [address], [address] [place] [address] authority and the [employer] Act. A caregiver was present when appropriate. Ability to conduct physical exam was limited. I was in the office. The patient was at home.       Prisca Del Angel NP

## 2022-01-05 NOTE — Clinical Note
Please schedule patient 6 months in office visit for hypertension, diabetes, high cholesterol, with labs 1 week prior.

## 2022-01-05 NOTE — TELEPHONE ENCOUNTER
I called  Svitlana Cook  To assist  this her  in scheduling a Mammogram . I left a message for this patient to return my call  at [phone].     Behzad Escalante LPN   Panel Manager

## 2022-02-08 DIAGNOSIS — G89.29 OTHER CHRONIC PAIN: Primary | ICD-10-CM

## 2022-02-09 RX ORDER — TRAMADOL HYDROCHLORIDE 50 MG/1
TABLET ORAL
Qty: 120 TABLET | Refills: 5 | Status: SHIPPED | OUTPATIENT
Start: 2022-02-09 | End: 2022-03-11

## 2022-03-19 PROBLEM — E11.21 TYPE 2 DIABETES WITH NEPHROPATHY (HCC): Status: ACTIVE | Noted: 2018-03-16

## 2022-04-12 ENCOUNTER — TELEPHONE (OUTPATIENT)
Dept: MAMMOGRAPHY | Age: 67
End: 2022-04-12

## 2022-04-12 ENCOUNTER — PATIENT MESSAGE (OUTPATIENT)
Dept: MAMMOGRAPHY | Age: 67
End: 2022-04-12

## 2022-05-13 DIAGNOSIS — F41.9 ANXIETY: ICD-10-CM

## 2022-05-13 DIAGNOSIS — E11.21 TYPE 2 DIABETES WITH NEPHROPATHY (HCC): ICD-10-CM

## 2022-05-13 DIAGNOSIS — E78.5 HYPERLIPIDEMIA, UNSPECIFIED HYPERLIPIDEMIA TYPE: ICD-10-CM

## 2022-05-13 DIAGNOSIS — I10 ESSENTIAL HYPERTENSION WITH GOAL BLOOD PRESSURE LESS THAN 130/80: ICD-10-CM

## 2022-05-13 RX ORDER — METFORMIN HYDROCHLORIDE 500 MG/1
TABLET, EXTENDED RELEASE ORAL
Qty: 60 TABLET | Refills: 11 | Status: SHIPPED | OUTPATIENT
Start: 2022-05-13

## 2022-05-13 RX ORDER — PAROXETINE HYDROCHLORIDE 20 MG/1
TABLET, FILM COATED ORAL
Qty: 30 TABLET | Refills: 11 | Status: SHIPPED | OUTPATIENT
Start: 2022-05-13

## 2022-05-13 RX ORDER — ATORVASTATIN CALCIUM 20 MG/1
TABLET, FILM COATED ORAL
Qty: 30 TABLET | Refills: 11 | Status: SHIPPED | OUTPATIENT
Start: 2022-05-13

## 2022-05-13 RX ORDER — VALSARTAN AND HYDROCHLOROTHIAZIDE 80; 12.5 MG/1; MG/1
TABLET, FILM COATED ORAL
Qty: 30 TABLET | Refills: 11 | Status: SHIPPED | OUTPATIENT
Start: 2022-05-13

## 2022-05-17 ENCOUNTER — TELEPHONE (OUTPATIENT)
Dept: FAMILY MEDICINE CLINIC | Age: 67
End: 2022-05-17

## 2022-05-17 NOTE — TELEPHONE ENCOUNTER
----- Message from Jojo Culver sent at 5/16/2022  1:33 PM EDT -----  Subject: Message to Provider    QUESTIONS  Information for Provider? pt states she ,issed a call from the office   attempted to transfer but unable to please advise and call pt  ---------------------------------------------------------------------------  --------------  [address]  What is the best way for the office to contact you? OK to leave message on   voicemail  Preferred Call Back Phone Number? [phone]  ---------------------------------------------------------------------------  --------------  SCRIPT ANSWERS  Relationship to Patient?  Self

## 2022-05-26 NOTE — TELEPHONE ENCOUNTER
Contacted patient and let her know that the phone call she missed was from Isaiah Hennessy LPN to schedule her mammogram. She was given they're number, [phone], to contact them to have it scheduled.

## 2022-06-05 DIAGNOSIS — G47.00 INSOMNIA, UNSPECIFIED TYPE: ICD-10-CM

## 2022-06-05 DIAGNOSIS — I10 ESSENTIAL HYPERTENSION: ICD-10-CM

## 2022-06-06 ENCOUNTER — OFFICE VISIT (OUTPATIENT)
Dept: CARDIOLOGY CLINIC | Age: 67
End: 2022-06-06
Payer: MEDICAID

## 2022-06-06 VITALS
DIASTOLIC BLOOD PRESSURE: 80 MMHG | OXYGEN SATURATION: 100 % | BODY MASS INDEX: 18.75 KG/M2 | WEIGHT: 131 LBS | HEART RATE: 71 BPM | HEIGHT: 70 IN | SYSTOLIC BLOOD PRESSURE: 118 MMHG

## 2022-06-06 DIAGNOSIS — I49.3 ASYMPTOMATIC PVCS: ICD-10-CM

## 2022-06-06 DIAGNOSIS — I10 ESSENTIAL HYPERTENSION: Primary | ICD-10-CM

## 2022-06-06 DIAGNOSIS — E11.21 TYPE 2 DIABETES WITH NEPHROPATHY (HCC): ICD-10-CM

## 2022-06-06 PROCEDURE — 93000 ELECTROCARDIOGRAM COMPLETE: CPT | Performed by: INTERNAL MEDICINE

## 2022-06-06 PROCEDURE — 99214 OFFICE O/P EST MOD 30 MIN: CPT | Performed by: INTERNAL MEDICINE

## 2022-06-06 PROCEDURE — 1123F ACP DISCUSS/DSCN MKR DOCD: CPT | Performed by: INTERNAL MEDICINE

## 2022-06-06 RX ORDER — ZOLPIDEM TARTRATE 5 MG/1
5 TABLET ORAL
Qty: 30 TABLET | Refills: 0 | Status: SHIPPED | OUTPATIENT
Start: 2022-06-06 | End: 2022-07-06

## 2022-06-06 NOTE — PROGRESS NOTES
HISTORY OF PRESENT ILLNESS  Felisa Boeck is a 78 y.o. female. Follow-up      Patient presents for a follow-up office visit. The patient was referred for evaluation of a cardiac murmur. Patient also has a history of essential hypertension, type 2 diabetes and dyslipidemia. The patient last underwent an exercise nuclear stress test and an echocardiogram in August 2014, both of which were normal studies. She He did not have any significant valvular heart disease. She also underwent a Holter monitor which showed occasional PVCs, but no other arrhythmias and was otherwise normal.    Patient underwent an echocardiogram in June 2021 which demonstrated no significant valvular heart disease, EF 60 to 65%, normal PA pressure. She was last seen in our office 1 year ago. Since last visit she has been feeling well. She denies any shortness of breath, leg swelling, orthopnea, or PND. She has lost some weight on intentionally. Past Medical History:   Diagnosis Date    Arthritis     Arthritis 12/19/2011    Heart murmur 12/19/2011    Heart murmur     History of echocardiogram 08/12/2014    EF 55-60%. No WMA.  History of seasonal allergies 12/19/2011    HTN (hypertension) 12/19/2011    Hypertension     Iron deficiency anemia 9/27/2012    Normal myocardial perfusion study 08/12/2014    No ischemia or prior infarction. Hyperdynamic EF >70%. No RWMA. Neg EKG on EST. Ex time 6 min 11 sec.     Seasonal allergies       Current Outpatient Medications   Medication Sig Dispense Refill    atorvastatin (LIPITOR) 20 mg tablet Take 1 tablet by mouth every day 30 Tablet 11    valsartan-hydroCHLOROthiazide (DIOVAN-HCT) 80-12.5 mg per tablet Take 1 tablet by mouth every day 30 Tablet 11    PARoxetine (PAXIL) 20 mg tablet Take 1 tablet by mouth every day 30 Tablet 11    metFORMIN ER (GLUCOPHAGE XR) 500 mg tablet Take 1 tablet by mouth twice daily 60 Tablet 11    zolpidem (AMBIEN) 5 mg tablet Take 1 Tablet by mouth nightly as needed for Sleep. Max Daily Amount: 5 mg. 90 Tablet 3    fluticasone propionate (FLONASE) 50 mcg/actuation nasal spray Use 2 sprays into each nostril every day as needed for runny nose/allergies 1 Each 11    meloxicam (MOBIC) 15 mg tablet Take 1 tablet by mouth every day with food 30 Tablet 11    aspirin 81 mg chewable tablet Take 1 tablet by mouth every day 30 Tablet 11    flash glucose scanning reader (FreeStyle You 2 [place]) misc CHECK BLOOD SUGAR 2 TIMES PER DAY. 1 TIME \"FASTING\" BEFORE BREAKFAST, AND 1 TIME AFTER A MEAL. 1 Each 0    flash glucose sensor (FreeStyle You 2 Sensor) kit CHECK BLOOD SUGAR 2 TIMES PER DAY. 1 TIME \"FASTING\" BEFORE BREAKFAST, AND 1 TIME AFTER A MEAL. 1 Kit 0    FEXOFENADINE HCL (ALLEGRA PO) Take 180 mg by mouth daily as needed. Allergies   Allergen Reactions    Grass Pollen Runny Nose and Sneezing        Social History     Tobacco Use    Smoking status: Never Smoker    Smokeless tobacco: Never Used   Vaping Use    Vaping Use: Never used   Substance Use Topics    Alcohol use: No    Drug use: No          Family History   Problem Relation Age of Onset    High Cholesterol Mother     Hypertension Father     Diabetes Father          Review of Systems   Constitutional: Positive for weight loss. Negative for chills and fever. HENT: Negative for nosebleeds. Eyes: Negative for blurred vision and double vision. Respiratory: Negative for cough and wheezing. Cardiovascular: Negative for palpitations, orthopnea, claudication, leg swelling and PND. Gastrointestinal: Negative for heartburn, nausea and vomiting. Genitourinary: Negative for dysuria and hematuria. Musculoskeletal: Negative for falls and myalgias. Skin: Negative for rash. Neurological: Negative for dizziness and focal weakness. Endo/Heme/Allergies: Does not bruise/bleed easily. Psychiatric/Behavioral: Negative for substance abuse.      Visit Vitals  /80 (BP 1 Location: Left upper arm, BP Patient Position: Sitting, BP Cuff Size: Adult)   Pulse 71   Ht 5' 10\" (1.778 m)   Wt 59.4 kg (131 lb)   LMP 01/20/2007   SpO2 100%   BMI 18.80 kg/m²       Physical Exam  Constitutional:       Appearance: She is well-developed. HENT:      Head: Normocephalic and atraumatic. Eyes:      Conjunctiva/sclera: Conjunctivae normal.   Neck:      Vascular: No carotid bruit or JVD. Cardiovascular:      Rate and Rhythm: Normal rate and regular rhythm. No extrasystoles are present. Pulses: Normal pulses. Heart sounds: S1 normal and S2 normal. No murmur heard. No gallop. Pulmonary:      Effort: Pulmonary effort is normal.      Breath sounds: Normal breath sounds. No wheezing or rales. Abdominal:      General: Bowel sounds are normal.      Palpations: Abdomen is soft. Tenderness: There is no abdominal tenderness. Musculoskeletal:         General: No swelling, tenderness or deformity. Cervical back: Neck supple. Skin:     General: Skin is warm and dry. Neurological:      General: No focal deficit present. Mental Status: She is alert and oriented to person, place, and time. Sensory: No sensory deficit. Motor: No weakness. Psychiatric:         Mood and Affect: Mood normal.       EKG: Normal sinus rhythm, normal axis, normal QTc interval, no ST or T wave abnormalities concerning for ischemia. Compared to previous EKG, no significant change. ASSESSMENT and PLAN    Premature ventricular contractions. This has been documented in the past on a Holter monitor. She remains asymptomatic. No evidence of structural heart disease on her last echocardiogram from June 2021. No additional work-up or treatment needed. Essential hypertension. Patient's blood pressure remains controlled on the valsartan/HCTZ, which I would continue. Diabetes mellitus, type II. Patient has been treating this with oral agents. Is followed closely by her PCP.   From a cardiac standpoint for hemoglobin A1c be less than 7. Follow-up annually, sooner if needed.

## 2022-06-06 NOTE — PROGRESS NOTES
Nahun Stallings presents today for   Chief Complaint   Patient presents with    Follow-up     1 year       Nahun Stallings preferred language for health care discussion is english/other. Is someone accompanying this pt? no    Is the patient using any DME equipment during [address]? no    Depression Screening:  3 most recent PHQ Screens 6/6/2022   PHQ Not Done -   Little interest or pleasure in doing things Not at all   Feeling down, depressed, irritable, or hopeless Not at all   Total Score PHQ 2 0       Learning Assessment:  Learning Assessment 6/6/2022   PRIMARY LEARNER Patient   CO-LEARNER CAREGIVER -   PRIMARY LANGUAGE ENGLISH   LEARNER PREFERENCE PRIMARY DEMONSTRATION   ANSWERED BY patient   RELATIONSHIP SELF       Abuse Screening:  Abuse Screening Questionnaire 6/6/2022   Do you ever feel afraid of your partner? N   Are you in a relationship with someone who physically or mentally threatens you? N   Is it safe for you to go home? Y       Fall Risk  Fall Risk Assessment, last 12 mths 6/6/2022   Able to walk? Yes   Fall in past 12 months? 0   Do you feel unsteady? 0   Are you worried about falling 0           Pt currently taking Anticoagulant therapy? no    Pt currently taking Antiplatelet therapy ? Aspirin 81 mg daily      Coordination of Care:  1. Have you been to the ER, urgent care clinic since your last visit? Hospitalized since your last visit? no    2. Have you seen or consulted any other health care providers outside of the [address] [place] [address] since your last visit? Include any pap smears or colon screening.  no

## 2022-07-06 DIAGNOSIS — G47.00 INSOMNIA, UNSPECIFIED TYPE: ICD-10-CM

## 2022-07-06 DIAGNOSIS — I10 ESSENTIAL HYPERTENSION: ICD-10-CM

## 2022-07-06 RX ORDER — ZOLPIDEM TARTRATE 5 MG/1
5 TABLET ORAL
Qty: 30 TABLET | Refills: 0 | Status: SHIPPED | OUTPATIENT
Start: 2022-07-06 | End: 2022-10-11 | Stop reason: SDUPTHER

## 2022-07-11 ENCOUNTER — OFFICE VISIT (OUTPATIENT)
Dept: FAMILY MEDICINE CLINIC | Age: 67
End: 2022-07-11
Payer: MEDICAID

## 2022-07-11 ENCOUNTER — HOSPITAL ENCOUNTER (OUTPATIENT)
Dept: LAB | Age: 67
Discharge: HOME | End: 2022-07-11
Payer: MEDICAID

## 2022-07-11 VITALS
BODY MASS INDEX: 18.45 KG/M2 | TEMPERATURE: 97.9 F | OXYGEN SATURATION: 99 % | DIASTOLIC BLOOD PRESSURE: 73 MMHG | HEART RATE: 60 BPM | WEIGHT: 131.8 LBS | RESPIRATION RATE: 18 BRPM | SYSTOLIC BLOOD PRESSURE: 119 MMHG | HEIGHT: 71 IN

## 2022-07-11 DIAGNOSIS — Z12.31 ENCOUNTER FOR SCREENING MAMMOGRAM FOR MALIGNANT NEOPLASM OF BREAST: ICD-10-CM

## 2022-07-11 DIAGNOSIS — E11.21 TYPE 2 DIABETES WITH NEPHROPATHY (HCC): ICD-10-CM

## 2022-07-11 DIAGNOSIS — Z13.29 SCREENING FOR THYROID DISORDER: ICD-10-CM

## 2022-07-11 DIAGNOSIS — Z13.21 ENCOUNTER FOR VITAMIN DEFICIENCY SCREENING: ICD-10-CM

## 2022-07-11 DIAGNOSIS — Z78.0 POSTMENOPAUSAL STATE: ICD-10-CM

## 2022-07-11 DIAGNOSIS — E78.5 HYPERLIPIDEMIA, UNSPECIFIED HYPERLIPIDEMIA TYPE: ICD-10-CM

## 2022-07-11 DIAGNOSIS — I10 ESSENTIAL HYPERTENSION: ICD-10-CM

## 2022-07-11 DIAGNOSIS — I10 ESSENTIAL HYPERTENSION: Primary | ICD-10-CM

## 2022-07-11 DIAGNOSIS — Z23 ENCOUNTER FOR IMMUNIZATION: ICD-10-CM

## 2022-07-11 LAB
25(OH)D3 SERPL-MCNC: 54 NG/ML (ref 30–100)
ANION GAP SERPL CALC-SCNC: 6 MMOL/L (ref 3–18)
BUN SERPL-MCNC: 20 MG/DL (ref 7–18)
BUN/CREAT SERPL: 19 (ref 12–20)
CALCIUM SERPL-MCNC: 9.9 MG/DL (ref 8.5–10.1)
CHLORIDE SERPL-SCNC: 103 MMOL/L (ref 100–111)
CHOLEST SERPL-MCNC: 91 MG/DL
CO2 SERPL-SCNC: 30 MMOL/L (ref 21–32)
CREAT SERPL-MCNC: 1.08 MG/DL (ref 0.6–1.3)
GLUCOSE SERPL-MCNC: 103 MG/DL (ref 74–99)
HBA1C MFR BLD HPLC: 5.8 %
HDLC SERPL-MCNC: 67 MG/DL (ref 40–60)
HDLC SERPL: 1.4 {RATIO} (ref 0–5)
LDLC SERPL CALC-MCNC: 17.6 MG/DL (ref 0–100)
LIPID PROFILE,FLP: ABNORMAL
POTASSIUM SERPL-SCNC: 4.4 MMOL/L (ref 3.5–5.5)
SODIUM SERPL-SCNC: 139 MMOL/L (ref 136–145)
TRIGL SERPL-MCNC: 32 MG/DL (ref ?–150)
TSH SERPL DL<=0.05 MIU/L-ACNC: 1.63 UIU/ML (ref 0.36–3.74)
VLDLC SERPL CALC-MCNC: 6.4 MG/DL

## 2022-07-11 PROCEDURE — 3044F HG A1C LEVEL LT 7.0%: CPT | Performed by: NURSE PRACTITIONER

## 2022-07-11 PROCEDURE — 82306 VITAMIN D 25 HYDROXY: CPT

## 2022-07-11 PROCEDURE — 90732 PPSV23 VACC 2 YRS+ SUBQ/IM: CPT | Performed by: NURSE PRACTITIONER

## 2022-07-11 PROCEDURE — 99214 OFFICE O/P EST MOD 30 MIN: CPT | Performed by: NURSE PRACTITIONER

## 2022-07-11 PROCEDURE — 84443 ASSAY THYROID STIM HORMONE: CPT

## 2022-07-11 PROCEDURE — 1124F ACP DISCUSS-NO DSCNMKR DOCD: CPT | Performed by: NURSE PRACTITIONER

## 2022-07-11 PROCEDURE — 36415 COLL VENOUS BLD VENIPUNCTURE: CPT

## 2022-07-11 PROCEDURE — 83036 HEMOGLOBIN GLYCOSYLATED A1C: CPT | Performed by: NURSE PRACTITIONER

## 2022-07-11 PROCEDURE — 80061 LIPID PANEL: CPT

## 2022-07-11 PROCEDURE — 80048 BASIC METABOLIC PNL TOTAL CA: CPT

## 2022-07-11 NOTE — PROGRESS NOTES
1. \"Have you been to the ER, urgent care clinic since your last visit? Hospitalized since your last visit? \" No    2. \"Have you seen or consulted any other health care providers outside of the [address] [place] [address] since your last visit? \" Yes Cardiology     3. For patients aged 39-70: Has the patient had a colonoscopy / FIT/ Cologuard? Yes - no Care Gap present      If the patient is female:    4. For patients aged 41-77: Has the patient had a mammogram within the past 2 years? No      5. For patients aged 21-65: Has the patient had a pap smear?  NA - based on age or sex

## 2022-07-11 NOTE — PATIENT INSTRUCTIONS
Abnormal Weight Loss: Care Instructions  Your Care Instructions     There are two types of weight loss--normal and abnormal. The normal kind happens when you are trying to lose weight by exercising more or eating less. The abnormal kind happens when you are not trying to lose weight. Many medical problems can cause abnormal weight loss. These include problems with your thyroid gland, long-term infections, mouth or throat problems that make it hard to eat, and digestive problems. They also include depression and cancer. Some medicines also may cause you to lose weight. You can work with your doctor to find the cause of your weight loss. You will probably need tests to do this. Follow-up care is a key part of your treatment and safety. Be sure to make and go to all appointments, and call your doctor if you are having problems. It's also a good idea to know your test results and keep a list of the medicines you take. How can you care for yourself at home? · Weigh yourself at the same time every day. It's best to do it first thing in the morning after you empty your bladder. Be sure to always wear the same amount of clothing. · Write down any changes in your weight and the possible causes. Discuss these with your doctor. · Your doctor may want you to change your diet. Do your best to follow his or her advice. · Ask your doctor if you should see a dietitian. This is a person who can help you plan meals that work best for your lifestyle. · Note any changes in bowel habits. These may include changes in how often you have a bowel movement. Other changes include the color and size of your stools and how solid they are. · If you are prescribed medicines, take them exactly as prescribed. Call your doctor if you think you are having a problem with your medicine. You will get more details on the specific medicines your doctor prescribes. When should you call for help?   Watch closely for changes in your health, and be sure to contact your doctor if:    · You do not get better as expected.     · You continue to lose weight. Where can you learn more? Go to http://www.gray.com/  Enter A790 in the search box to learn more about \"Abnormal Weight Loss: Care Instructions. \"  Current as of: December 27, 2021               Content Version: 13.2  © 4134-7297 [employer]. Care instructions adapted under license by [employer] (which disclaims liability or warranty for this information). If you have questions about a medical condition or this instruction, always ask your healthcare professional. [place] [address] any warranty or liability for your use of this information.

## 2022-07-11 NOTE — PROGRESS NOTES
Office Note      Assessment & Plan:     diabetes stable, hypertension stable, hyperlipidemia stable. Diabetic issues reviewed with her: diabetic diet discussed in detail, written exchange diet given, low cholesterol diet, weight control and daily exercise discussed, home glucose monitoring emphasized, all medications, side effects and compliance discussed carefully, foot care discussed and Podiatry visits discussed, annual eye examinations at Ophthalmology discussed, glycohemoglobin and other lab monitoring discussed, long term diabetic complications discussed and labs immediately prior to next visit. Hypertension issues reviewed with her: Continue current therapy       Diagnoses and all orders for this visit:    1. Essential hypertension  -     METABOLIC PANEL, BASIC; Future    2. Hyperlipidemia, unspecified hyperlipidemia type  -     LIPID PANEL; Future    3. Type 2 diabetes with nephropathy (HCC)  -      DIABETES FOOT EXAM  -      DIABETES EDUCATION  -     REFERRAL TO OPHTHALMOLOGY  -     [facility] POC HEMOGLOBIN A1C    4. Encounter for vitamin deficiency screening  -     VITAMIN D, 25 HYDROXY; Future    5. Screening for thyroid disorder  -     TSH 3RD GENERATION; Future    6. Postmenopausal state  -     DEXA BONE DENSITY STUDY AXIAL; Future    7. Encounter for screening mammogram for malignant neoplasm of breast  -     SMILEY MAMMO BI SCREENING INCL CAD; Future    8. Encounter for immunization  -     PNEUMOCOCCAL, PPSV23, (AGE 2 YRS+), SC/IM  -     ADMIN PNEUMOCOCCAL VACCINE           Follow-up and Dispositions    · Return in about 3 months (around 10/11/2022) for Unintentional Weight Loss, Hypertension, High Cholesterol, Diabetes, (In Office ONLY). Subjective:     Crystal Chandler is a 78 y.o. BLACK/ female who was seen in clinic today (7/11/2022) for evaluation of Hypertension, Hyperlipidemia, and Diabetes  .       Hypertension/HLD:    Diet and Lifestyle: generally follows a low fat low cholesterol diet, generally follows a low sodium diet, follows a diabetic diet regularly  Home BP Monitoring: is well controlled at home, ranging 1 teen's/80's    Cardiovascular ROS: taking medications as instructed, no medication side effects noted, no TIA's, no chest pain on exertion, no dyspnea on exertion, no swelling of ankles. Lab Results   Component Value Date/Time    Sodium 143 04/22/2021 08:55 AM    Potassium 4.6 04/22/2021 08:55 AM    Chloride 103 04/22/2021 08:55 AM    CO2 25 04/22/2021 08:55 AM    Anion gap 6 02/03/2020 03:02 PM    Glucose 99 04/22/2021 08:55 AM    BUN 18 04/22/2021 08:55 AM    Creatinine 0.88 04/22/2021 08:55 AM    BUN/Creatinine ratio 20 04/22/2021 08:55 AM    GFR est AA 79 04/22/2021 08:55 AM    GFR est non-AA 69 04/22/2021 08:55 AM    Calcium 10.3 04/22/2021 08:55 AM      Lab Results   Component Value Date/Time    WBC 6.8 12/22/2016 12:32 PM    HGB 11.2 (L) 12/22/2016 12:32 PM    HCT 34.6 (L) 12/22/2016 12:32 PM    PLATELET 644 70/73/3011 12:32 PM    MCV 84.2 12/22/2016 12:32 PM      Lab Results   Component Value Date/Time    Hemoglobin A1c 6.2 (H) 04/22/2021 08:55 AM    Hemoglobin A1c (POC) 5.8 07/11/2022 09:28 AM      Lab Results   Component Value Date/Time    Cholesterol, total 104 04/22/2021 08:55 AM    HDL Cholesterol 58 04/22/2021 08:55 AM    LDL, calculated 29 04/22/2021 08:55 AM    LDL, calculated 76 02/03/2020 03:02 PM    VLDL, calculated 17 04/22/2021 08:55 AM    VLDL, calculated 16 02/03/2020 03:02 PM    Triglyceride 86 04/22/2021 08:55 AM    CHOL/HDL Ratio 2.3 02/03/2020 03:02 PM        Key Antihyperlipidemia Meds             atorvastatin (LIPITOR) 20 mg tablet (Taking) Take 1 tablet by mouth every day           New concerns: Pt states that she feels like she has been losing weight. Pt states that she is eating 3 meals a day, and just started with a meal service called [employer] that are delivered 2 times per month.  They supply breakfast, lunch, dinner, and snacks. The meals are covered by her insurance. The meal are diabetic approved. Pt just started the meals last month, and was not eating as much prior to. Diabetes:    Since last visit, she  reports: no significant changes. She reports medication compliance: compliant most of the time. Medication side effects: none. Diabetic diet compliance: compliant most of the time   Activity level:moderately active    Home glucoses:  Fastin's  PP: 1 teens  Hypoglycemic episodes: N/A    Last A1c:   Lab Results   Component Value Date/Time    Hemoglobin A1c 6.2 (H) 2021 08:55 AM    Hemoglobin A1c (POC) 5.8 2022 09:28 AM        Diabetic foot exam:     Left Foot:   Visual Exam: normal    Pulse DP: 2+ (normal)   Filament test: normal sensation    Vibratory sensation: normal      Right Foot:   Visual Exam: normal    Pulse DP: 2+ (normal)   Filament test: normal sensation    Vibratory sensation: normal      Diabetic Foot and Eye Exam HM Status   Topic Date Due    Eye Exam  Never done    Diabetic Foot Care  2023           Objective:     Visit Vitals  /73 (BP 1 Location: Right upper arm, BP Patient Position: Sitting, BP Cuff Size: Small adult)   Pulse 60   Temp 97.9 °F (36.6 °C) (Temporal)   Resp 18   Ht 5' 11\" (1.803 m)   Wt 131 lb 12.8 oz (59.8 kg)   LMP 2007   SpO2 99%   BMI 18.38 kg/m²       Appearance: alert, well appearing, and in no distress and oriented to person, place, and time. Exam: heart sounds irregularly irregular rhythm, Murmur noted, chest clear, no hepatosplenomegaly, no carotid bruits  Lab review: labs are reviewed, up to date. Disclaimer:    I have discussed the diagnosis with the patient and the intended plan as seen above. The patient understands our medical plan. The risks, benefits and significant side effects of all medications have been reviewed. Anticipated time course and progression of condition reviewed. All questions have been addressed.   She received an after visit summary, with information reviewed, and questions answered. Where appropriate, she is instructed to call the clinic if she has not been notified either by phone or through [address] with the results of her tests or with an appointment plan for any referrals within 1 week(s). The patient  is to call if her condition worsens or fails to improve or if significant side effects are experienced.        Dimas Rogers, MOHSEN

## 2022-07-12 DIAGNOSIS — E11.21 TYPE 2 DIABETES WITH NEPHROPATHY (HCC): ICD-10-CM

## 2022-07-13 RX ORDER — GUAIFENESIN 100 MG/5ML
LIQUID (ML) ORAL
Qty: 30 TABLET | Refills: 11 | Status: SHIPPED | OUTPATIENT
Start: 2022-07-13

## 2022-07-27 DIAGNOSIS — I10 ESSENTIAL HYPERTENSION: ICD-10-CM

## 2022-07-27 DIAGNOSIS — G47.00 INSOMNIA, UNSPECIFIED TYPE: ICD-10-CM

## 2022-07-27 RX ORDER — ZOLPIDEM TARTRATE 5 MG/1
5 TABLET ORAL
Qty: 30 TABLET | Refills: 0 | OUTPATIENT
Start: 2022-07-27

## 2022-07-27 NOTE — TELEPHONE ENCOUNTER
Last visit 07/11/2022 NP Meet Barry   Next appointment 10/10/2022 NP Meet Barry   Previous refill encounter(s)   07/06/2022 Ambien #[id number]     [facility]    Intervention Detail: New Rx: 1, reason: Patient Preference  Time Spent (min): 5      Requested Prescriptions     Pending Prescriptions Disp Refills    zolpidem (AMBIEN) 5 mg tablet 30 Tablet 0     Sig: Take 1 Tablet by mouth nightly as needed for Sleep. Max Daily Amount: 5 mg. Pt needs an updated/new CSA prior to further refills on controlled substances.

## 2022-08-09 DIAGNOSIS — G89.29 OTHER CHRONIC PAIN: Primary | ICD-10-CM

## 2022-08-09 NOTE — TELEPHONE ENCOUNTER
[facility] Ultram as 7[facility]21/22 for a 30 d/s. Last Visit: 7/11/22 with MOHSEN Grossman  Next Appointment: 10/10/22 with MOHSEN Grossman  Previous Refill Encounter(s): 2/9/22 #120 with 5 refills    Requested Prescriptions     Pending Prescriptions Disp Refills    traMADoL (ULTRAM) 50 mg tablet 120 Tablet 5     Sig: Take 1 Tablet by mouth every six (6) hours as needed for Pain for up to 30 days. Max Daily Amount: 200 mg. For [address] in place:   Recommendation Provided To:    Intervention Detail: New Rx: 1, reason: Patient Preference  Gap Closed?:   Intervention Accepted By:   Time Spent (min): 5

## 2022-08-12 RX ORDER — TRAMADOL HYDROCHLORIDE 50 MG/1
50 TABLET ORAL
Qty: 120 TABLET | Refills: 0 | Status: SHIPPED | OUTPATIENT
Start: 2022-08-12 | End: 2022-09-11

## 2022-08-31 DIAGNOSIS — G89.29 OTHER CHRONIC PAIN: ICD-10-CM

## 2022-08-31 RX ORDER — TRAMADOL HYDROCHLORIDE 50 MG/1
50 TABLET ORAL
Qty: 120 TABLET | Refills: 0 | OUTPATIENT
Start: 2022-08-31 | End: 2022-09-30

## 2022-08-31 NOTE — TELEPHONE ENCOUNTER
[facility] Ultram as 8/18/22 for a 30 d/s. Last Visit: 7/11/22 with NP Aditioll Indio  Next Appointment: 10/10/22 with NP Colette Borjas  Previous Refill Encounter(s): 8/12/22 #120    Requested Prescriptions     Pending Prescriptions Disp Refills    traMADoL (ULTRAM) 50 mg tablet 120 Tablet 0     Sig: Take 1 Tablet by mouth every six (6) hours as needed for Pain for up to 30 days. Max Daily Amount: 200 mg. Pt needs an updated/new CSA prior to further refills on controlled substances. For [address] [facility] in place:   Recommendation Provided To:    Intervention Detail: New Rx: 1, reason: Patient Preference  Gap Closed?:   Intervention Accepted By:   Time Spent (min): 5

## 2022-09-30 DIAGNOSIS — M06.9 RHEUMATOID ARTHRITIS INVOLVING MULTIPLE SITES, UNSPECIFIED WHETHER RHEUMATOID FACTOR PRESENT (HCC): ICD-10-CM

## 2022-09-30 RX ORDER — FLUTICASONE PROPIONATE 50 MCG
SPRAY, SUSPENSION (ML) NASAL
Qty: 1 EACH | Refills: 11 | Status: SHIPPED | OUTPATIENT
Start: 2022-09-30

## 2022-09-30 RX ORDER — MELOXICAM 15 MG/1
TABLET ORAL
Qty: 30 TABLET | Refills: 11 | Status: SHIPPED | OUTPATIENT
Start: 2022-09-30

## 2022-10-11 ENCOUNTER — OFFICE VISIT (OUTPATIENT)
Dept: FAMILY MEDICINE CLINIC | Age: 67
End: 2022-10-11
Payer: MEDICAID

## 2022-10-11 ENCOUNTER — HOSPITAL ENCOUNTER (OUTPATIENT)
Dept: MAMMOGRAPHY | Age: 67
Discharge: HOME | End: 2022-10-11
Attending: NURSE PRACTITIONER
Payer: MEDICAID

## 2022-10-11 ENCOUNTER — HOSPITAL ENCOUNTER (OUTPATIENT)
Dept: BONE DENSITY | Age: 67
Discharge: HOME | End: 2022-10-11
Attending: NURSE PRACTITIONER
Payer: MEDICAID

## 2022-10-11 ENCOUNTER — HOSPITAL ENCOUNTER (OUTPATIENT)
Dept: LAB | Age: 67
Discharge: HOME | End: 2022-10-11
Payer: MEDICAID

## 2022-10-11 VITALS
OXYGEN SATURATION: 98 % | RESPIRATION RATE: 18 BRPM | HEART RATE: 42 BPM | WEIGHT: 128 LBS | HEIGHT: 71 IN | SYSTOLIC BLOOD PRESSURE: 125 MMHG | DIASTOLIC BLOOD PRESSURE: 58 MMHG | TEMPERATURE: 97.2 F | BODY MASS INDEX: 17.92 KG/M2

## 2022-10-11 DIAGNOSIS — I10 ESSENTIAL HYPERTENSION: Primary | ICD-10-CM

## 2022-10-11 DIAGNOSIS — Z23 ENCOUNTER FOR IMMUNIZATION: ICD-10-CM

## 2022-10-11 DIAGNOSIS — R63.4 UNINTENTIONAL WEIGHT LOSS: ICD-10-CM

## 2022-10-11 DIAGNOSIS — E78.5 HYPERLIPIDEMIA, UNSPECIFIED HYPERLIPIDEMIA TYPE: ICD-10-CM

## 2022-10-11 DIAGNOSIS — R01.1 HEART MURMUR: ICD-10-CM

## 2022-10-11 DIAGNOSIS — G47.00 INSOMNIA, UNSPECIFIED TYPE: ICD-10-CM

## 2022-10-11 DIAGNOSIS — Z78.0 POSTMENOPAUSAL STATE: ICD-10-CM

## 2022-10-11 DIAGNOSIS — Z12.11 SCREEN FOR COLON CANCER: ICD-10-CM

## 2022-10-11 DIAGNOSIS — Z12.31 ENCOUNTER FOR SCREENING MAMMOGRAM FOR MALIGNANT NEOPLASM OF BREAST: ICD-10-CM

## 2022-10-11 LAB
BASOPHILS # BLD: 0.1 K/UL (ref 0–0.1)
BASOPHILS NFR BLD: 1 % (ref 0–2)
DIFFERENTIAL METHOD BLD: ABNORMAL
EOSINOPHIL # BLD: 0.1 K/UL (ref 0–0.4)
EOSINOPHIL NFR BLD: 1 % (ref 0–5)
ERYTHROCYTE [DISTWIDTH] IN BLOOD BY AUTOMATED COUNT: 15.4 % (ref 11.6–14.5)
HCT VFR BLD AUTO: 30.3 % (ref 35–45)
HGB BLD-MCNC: 9.5 G/DL (ref 12–16)
IMM GRANULOCYTES # BLD AUTO: 0 K/UL (ref 0–0.04)
IMM GRANULOCYTES NFR BLD AUTO: 0 % (ref 0–0.5)
LYMPHOCYTES # BLD: 2 K/UL (ref 0.9–3.6)
LYMPHOCYTES NFR BLD: 24 % (ref 21–52)
MCH RBC QN AUTO: 26.8 PG (ref 24–34)
MCHC RBC AUTO-ENTMCNC: 31.4 G/DL (ref 31–37)
MCV RBC AUTO: 85.6 FL (ref 78–100)
MONOCYTES # BLD: 0.5 K/UL (ref 0.05–1.2)
MONOCYTES NFR BLD: 6 % (ref 3–10)
NEUTS SEG # BLD: 5.7 K/UL (ref 1.8–8)
NEUTS SEG NFR BLD: 68 % (ref 40–73)
NRBC # BLD: 0 K/UL (ref 0–0.01)
NRBC BLD-RTO: 0 PER 100 WBC
PLATELET # BLD AUTO: 263 K/UL (ref 135–420)
PMV BLD AUTO: 13.4 FL (ref 9.2–11.8)
RBC # BLD AUTO: 3.54 M/UL (ref 4.2–5.3)
WBC # BLD AUTO: 8.4 K/UL (ref 4.6–13.2)

## 2022-10-11 PROCEDURE — 99214 OFFICE O/P EST MOD 30 MIN: CPT | Performed by: NURSE PRACTITIONER

## 2022-10-11 PROCEDURE — 77063 BREAST TOMOSYNTHESIS BI: CPT

## 2022-10-11 PROCEDURE — 85025 COMPLETE CBC W/AUTO DIFF WBC: CPT

## 2022-10-11 PROCEDURE — 1124F ACP DISCUSS-NO DSCNMKR DOCD: CPT | Performed by: NURSE PRACTITIONER

## 2022-10-11 PROCEDURE — 77080 DXA BONE DENSITY AXIAL: CPT

## 2022-10-11 PROCEDURE — 90694 VACC AIIV4 NO PRSRV 0.5ML IM: CPT | Performed by: NURSE PRACTITIONER

## 2022-10-11 PROCEDURE — 36415 COLL VENOUS BLD VENIPUNCTURE: CPT

## 2022-10-11 RX ORDER — ZOLPIDEM TARTRATE 5 MG/1
5 TABLET ORAL
Qty: 30 TABLET | Refills: 0 | Status: CANCELLED | OUTPATIENT
Start: 2022-10-11

## 2022-10-11 RX ORDER — ZOLPIDEM TARTRATE 5 MG/1
5 TABLET ORAL
Qty: 30 TABLET | Refills: 3 | Status: SHIPPED | OUTPATIENT
Start: 2022-10-11

## 2022-10-11 NOTE — PROGRESS NOTES
1. \"Have you been to the ER, urgent care clinic since your last visit? Hospitalized since your last visit? \" No    2. \"Have you seen or consulted any other health care providers outside of the [address] [place] [address] since your last visit? \" No     3. For patients aged 39-70: Has the patient had a colonoscopy / FIT/ Cologuard? Yes - no Care Gap present      If the patient is female:    4. For patients aged 41-77: Has the patient had a mammogram within the past 2 years? Yes - no Care Gap present      5. For patients aged 21-65: Has the patient had a pap smear?  Yes - no Care Gap present

## 2022-10-11 NOTE — PROGRESS NOTES
Office Note      Assessment & Plan:     diabetes stable, hypertension stable, hyperlipidemia stable. Diabetic issues reviewed with her: diabetic diet discussed in detail, written exchange diet given, low cholesterol diet, weight control and daily exercise discussed, home glucose monitoring emphasized, long term diabetic complications discussed, and labs immediately prior to next visit. Hypertension issues reviewed with her: Continue current therapy       Diagnoses and all orders for this visit:    1. Essential hypertension    2. Insomnia, unspecified type  -     zolpidem (AMBIEN) 5 mg tablet; Take 1 Tablet by mouth nightly as needed for Sleep. Max Daily Amount: 5 mg. Indications: difficulty falling asleep    3. Encounter for immunization  -     INFLUENZA, FLUAD, (AGE 65 Y+), IM, PF, 0.5 ML    4. Heart murmur    5. Hyperlipidemia, unspecified hyperlipidemia type    6. Unintentional weight loss  -     CBC WITH AUTOMATED DIFF; Future    7. Screen for colon cancer  -     REFERRAL FOR COLONOSCOPY         Follow-up and Dispositions    Return in about 2 weeks (around 10/25/2022) for Unintentional Weight Loss, (In Office), (VV). Subjective:     Kristen Pruitt is a 78 y.o. BLACK/ female who was seen in clinic today (10/11/2022) for evaluation of Weight Loss, Hyperlipidemia, Hypertension, and Diabetes  . Hypertension/HLD:    Diet and Lifestyle: generally follows a low fat low cholesterol diet, generally follows a low sodium diet, follows a diabetic diet regularly  Home BP Monitoring: is well controlled at home, ranging 120's/70's-80's    Cardiovascular ROS: taking medications as instructed, no medication side effects noted, no TIA's, no chest pain on exertion, no dyspnea on exertion, no swelling of ankles.      Lab Results   Component Value Date/Time    Sodium 139 07/11/2022 09:47 AM    Potassium 4.4 07/11/2022 09:47 AM    Chloride 103 07/11/2022 09:47 AM    CO2 30 07/11/2022 09:47 AM Anion gap 6 2022 09:47 AM    Glucose 103 (H) 2022 09:47 AM    BUN 20 (H) 2022 09:47 AM    Creatinine 1.08 2022 09:47 AM    BUN/Creatinine ratio 19 2022 09:47 AM    GFR est AA >60 2022 09:47 AM    GFR est non-AA 51 (L) 2022 09:47 AM    Calcium 9.9 2022 09:47 AM      Lab Results   Component Value Date/Time    WBC 8.4 10/11/2022 11:29 AM    HGB 9.5 (L) 10/11/2022 11:29 AM    HCT 30.3 (L) 10/11/2022 11:29 AM    PLATELET 403  11:29 AM    MCV 85.6 10/11/2022 11:29 AM      Lab Results   Component Value Date/Time    Hemoglobin A1c 6.2 (H) 2021 08:55 AM    Hemoglobin A1c (POC) 5.8 2022 09:28 AM      Lab Results   Component Value Date/Time    Cholesterol, total 91 2022 09:47 AM    HDL Cholesterol 67 (H) 2022 09:47 AM    LDL, calculated 17.6 2022 09:47 AM    VLDL, calculated 6.4 2022 09:47 AM    Triglyceride 32 2022 09:47 AM    CHOL/HDL Ratio 1.4 2022 09:47 AM        Key Antihyperlipidemia Meds               atorvastatin (LIPITOR) 20 mg tablet (Taking) Take 1 tablet by mouth every day             New concerns: Pt is still losing weight despite her eating 3 meals per day. Pt states that she is also having night sweats that started in September. Pt has lost a total of 17 lbs since 2022. Pt denies. Diabetes:    Since last visit, she  reports: no significant changes. She reports medication compliance: compliant most of the time. Medication side effects: none.     Diabetic diet compliance: noncompliant some of the time   Activity level:not active    Home glucoses:  Fastin's  PP: 1 teen's  Hypoglycemic episodes: N/A    Last A1c:   Lab Results   Component Value Date/Time    Hemoglobin A1c 6.2 (H) 2021 08:55 AM    Hemoglobin A1c (POC) 5.8 2022 09:28 AM        Diabetic Foot and Eye Exam HM Status   Topic Date Due    Eye Exam  Never done    Diabetic Foot Care  2023           Objective: Visit Vitals  BP (!) 125/58 (BP 1 Location: Left upper arm, BP Patient Position: Sitting, BP Cuff Size: Small adult)   Pulse (!) 42   Temp 97.2 °F (36.2 °C) (Temporal)   Resp 18   Ht 5' 11\" (1.803 m)   Wt 128 lb (58.1 kg)   LMP 01/20/2007   SpO2 98%   BMI 17.85 kg/m²       Appearance: alert, well appearing, and in no distress and oriented to person, place, and time. Exam: heart sounds irregularly irregular rhythm, Murmur noted , chest clear, no carotid bruits  Lab review: labs are reviewed, up to date. Disclaimer: The patient understands our medical plan. Alternatives have been explained and offered. The risks, benefits and significant side effects of all medications have been reviewed. Anticipated time course and progression of condition reviewed. All questions have been addressed. She is encouraged to employ the information provided in the after visit summary, which was reviewed. Where applicable, she is instructed to call the clinic if she has not been notified either by phone or through [address] with the results of her tests or with an appointment plan for any referrals within 1 week(s). No news is not good news; it's no news. The patient  is to call if her condition worsens or fails to improve or if significant side effects are experienced. Aspects of this note may have been generated using voice recognition software. Despite editing, there may be unrecognized errors.       Jesus Alberto Bustillos NP

## 2022-10-25 ENCOUNTER — OFFICE VISIT (OUTPATIENT)
Dept: FAMILY MEDICINE CLINIC | Age: 67
End: 2022-10-25
Payer: MEDICAID

## 2022-10-25 VITALS
BODY MASS INDEX: 17.92 KG/M2 | HEART RATE: 60 BPM | RESPIRATION RATE: 18 BRPM | HEIGHT: 71 IN | OXYGEN SATURATION: 100 % | SYSTOLIC BLOOD PRESSURE: 113 MMHG | TEMPERATURE: 97.8 F | DIASTOLIC BLOOD PRESSURE: 65 MMHG | WEIGHT: 128 LBS

## 2022-10-25 DIAGNOSIS — D50.9 IRON DEFICIENCY ANEMIA, UNSPECIFIED IRON DEFICIENCY ANEMIA TYPE: ICD-10-CM

## 2022-10-25 DIAGNOSIS — M81.0 AGE-RELATED OSTEOPOROSIS WITHOUT CURRENT PATHOLOGICAL FRACTURE: ICD-10-CM

## 2022-10-25 DIAGNOSIS — R61 NIGHT SWEATS: ICD-10-CM

## 2022-10-25 DIAGNOSIS — R63.4 UNINTENTIONAL WEIGHT LOSS: Primary | ICD-10-CM

## 2022-10-25 PROCEDURE — 3078F DIAST BP <80 MM HG: CPT | Performed by: NURSE PRACTITIONER

## 2022-10-25 PROCEDURE — 1123F ACP DISCUSS/DSCN MKR DOCD: CPT | Performed by: NURSE PRACTITIONER

## 2022-10-25 PROCEDURE — 3074F SYST BP LT 130 MM HG: CPT | Performed by: NURSE PRACTITIONER

## 2022-10-25 PROCEDURE — 99214 OFFICE O/P EST MOD 30 MIN: CPT | Performed by: NURSE PRACTITIONER

## 2022-10-25 RX ORDER — BISACODYL 5 MG
5 TABLET, DELAYED RELEASE (ENTERIC COATED) ORAL DAILY
Qty: 90 TABLET | Refills: 3 | Status: SHIPPED | OUTPATIENT
Start: 2022-10-25

## 2022-10-25 RX ORDER — MULTIVITAMIN
1 TABLET ORAL 2 TIMES DAILY
Qty: 180 TABLET | Refills: 3 | Status: SHIPPED | OUTPATIENT
Start: 2022-10-25

## 2022-10-25 RX ORDER — ALENDRONATE SODIUM 70 MG/1
70 TABLET ORAL
Qty: 14 TABLET | Refills: 3 | Status: SHIPPED | OUTPATIENT
Start: 2022-10-25

## 2022-10-25 RX ORDER — LANOLIN ALCOHOL/MO/W.PET/CERES
325 CREAM (GRAM) TOPICAL
Qty: 90 TABLET | Refills: 3 | Status: SHIPPED | OUTPATIENT
Start: 2022-10-25

## 2022-10-25 NOTE — PROGRESS NOTES
1. \"Have you been to the ER, urgent care clinic since your last visit? Hospitalized since your last visit? \" No    2. \"Have you seen or consulted any other health care providers outside of the [address] [place] [address] since your last visit? \" No     3. For patients aged 39-70: Has the patient had a colonoscopy / FIT/ Cologuard? Yes - no Care Gap present      If the patient is female:    4. For patients aged 41-77: Has the patient had a mammogram within the past 2 years? Yes - no Care Gap present      5. For patients aged 21-65: Has the patient had a pap smear?  Yes - no Care Gap present

## 2022-10-25 NOTE — PROGRESS NOTES
General Office Visit Note    Assessment/Plan:     Diagnoses and all orders for this visit:    1. Unintentional weight loss  -     AMB POC TUBERCULOSIS, INTRADERMAL (SKIN TEST)  -     CT CHEST W CONT; Future    2. Night sweats  -     AMB POC TUBERCULOSIS, INTRADERMAL (SKIN TEST)  -     CT CHEST W CONT; Future    3. Iron deficiency anemia, unspecified iron deficiency anemia type  -     ferrous sulfate 325 mg (65 mg iron) tablet; Take 1 Tablet by mouth Daily (before breakfast). Take with 4 oz of orange juice or vit. C supplement. 4. Age-related osteoporosis without current pathological fracture  -     alendronate (FOSAMAX) 70 mg tablet; Take 1 Tablet by mouth every seven (7) days. -     calcium-cholecalciferol, D3, (Calcium with Vitamin D) tablet; Take 1 Tablet by mouth two (2) times a day. -     bisacodyL (DULCOLAX) 5 mg EC tablet; Take 1 Tablet by mouth daily. Follow-up and Dispositions    Return in about 4 months (around 2/25/2023) for HTN, High Cholesterol, Diabetes, Unintentional Weight Loss, (In Office ONLY), A1C 1 Wk Prior. Subjective:     Francheska Mendes is a 78 y.o. y.o. female who complains of   Chief Complaint   Patient presents with    Weight Loss     Pt is following up for unintientinal weight loss. Pt's CBC only showed iron deficiency anemia. Pt stqtes she is still having night sweats almost night. Pt denies pain, SOB, CP, or cough. Pt has no hx of TB exposure. Pt's Dexa Scan show osteoporosis, T  score -2.7 in the Left Femoral Neck. Past Medical History:   Diagnosis Date    Arthritis     Arthritis 12/19/2011    Heart murmur 12/19/2011    Heart murmur     History of echocardiogram 08/12/2014    EF 55-60%. No WMA. History of seasonal allergies 12/19/2011    HTN (hypertension) 12/19/2011    Hypertension     Iron deficiency anemia 9/27/2012    Normal myocardial perfusion study 08/12/2014    No ischemia or prior infarction. Hyperdynamic EF >70%. No RWMA. Neg EKG on EST.   Ex time 6 min 11 sec. Seasonal allergies      Past Surgical History:   Procedure Laterality Date    HX  SECTION      single birth csection    HX  SECTION      a set of twins born by Csection    HX ORTHOPAEDIC  2005    Carpal Tunnel surgery     Social History     Socioeconomic History    Marital status:    Tobacco Use    Smoking status: Never    Smokeless tobacco: Never   Vaping Use    Vaping Use: Never used   Substance and Sexual Activity    Alcohol use: No    Drug use: No    Sexual activity: Yes     Partners: Male   Other Topics Concern    Caffeine Concern No    Sleep Concern No    Stress Concern Yes     Comment: 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 14 Tablet 3    ferrous sulfate 325 mg (65 mg iron) tablet Take 1 Tablet by mouth Daily (before breakfast). Take with 4 oz of orange juice or vit. C supplement. 90 Tablet 3    calcium-cholecalciferol, D3, (Calcium with Vitamin D) tablet Take 1 Tablet by mouth two (2) times a day. 180 Tablet 3    bisacodyL (DULCOLAX) 5 mg EC tablet Take 1 Tablet by mouth daily. 90 Tablet 3    zolpidem (AMBIEN) 5 mg tablet Take 1 Tablet by mouth nightly as needed for Sleep. Max Daily Amount: 5 mg.  Indications: difficulty falling asleep 30 Tablet 3    fluticasone propionate (FLONASE) 50 mcg/actuation nasal spray Use 2 sprays in each nostril every day as needed for runny nose/allergies 1 Each 11    meloxicam (MOBIC) 15 mg tablet Take 1 tablet by mouth every day with food 30 Tablet 11    aspirin 81 mg chewable tablet Take 1 tablet by mouth every day 30 Tablet 11    atorvastatin (LIPITOR) 20 mg tablet Take 1 tablet by mouth every day 30 Tablet 11    valsartan-hydroCHLOROthiazide (DIOVAN-HCT) 80-12.5 mg per tablet Take 1 tablet by mouth every day 30 Tablet 11    PARoxetine (PAXIL) 20 mg tablet Take 1 tablet by mouth every day 30 Tablet 11    metFORMIN ER (GLUCOPHAGE XR) 500 mg tablet Take 1 tablet by mouth twice daily 60 Tablet 11    flash glucose scanning reader ([employer] [place]) misc CHECK BLOOD SUGAR 2 TIMES PER DAY. 1 TIME \"FASTING\" BEFORE BREAKFAST, AND 1 TIME AFTER A MEAL. 1 Each 0    FEXOFENADINE HCL (ALLEGRA PO) Take 180 mg by mouth daily as needed. flash glucose sensor (Free[employer] You 2 Sensor) kit CHECK BLOOD SUGAR 2 TIMES PER DAY. 1 TIME \"FASTING\" BEFORE BREAKFAST, AND 1 TIME AFTER A MEAL. (Patient not taking: Reported on 10/25/2022) 1 Kit 0     Allergies   Allergen Reactions    Grass Pollen Runny Nose and Sneezing     The patient has a family history of    REVIEW OF SYSTEMS  ROS    Objective:     Visit Vitals  /65 (BP 1 Location: Right upper arm, BP Patient Position: Sitting, BP Cuff Size: Small adult)   Pulse 60   Temp 97.8 °F (36.6 °C) (Temporal)   Resp 18   Ht 5' 11\" (1.803 m)   Wt 128 lb (58.1 kg)   LMP 01/20/2007   SpO2 100%   BMI 17.85 kg/m²       Current Outpatient Medications   Medication Instructions    alendronate (FOSAMAX) 70 mg, Oral, EVERY 7 DAYS    aspirin 81 mg chewable tablet Take 1 tablet by mouth every day    atorvastatin (LIPITOR) 20 mg tablet Take 1 tablet by mouth every day    bisacodyL (DULCOLAX) 5 mg, Oral, DAILY    calcium-cholecalciferol, D3, (Calcium with Vitamin D) tablet 1 Tablet, Oral, 2 TIMES DAILY    ferrous sulfate 325 mg, Oral, DAILY BEFORE BREAKFAST, Take with 4 oz of orange juice or vit. C supplement. FEXOFENADINE HCL (ALLEGRA PO) 180 mg, DAILY AS NEEDED    flash glucose scanning reader ([employer] [place]) misc CHECK BLOOD SUGAR 2 TIMES PER DAY. 1 TIME \"FASTING\" BEFORE BREAKFAST, AND 1 TIME AFTER A MEAL. flash glucose sensor (FreeStyle You 2 Sensor) kit CHECK BLOOD SUGAR 2 TIMES PER DAY. 1 TIME \"FASTING\" BEFORE BREAKFAST, AND 1 TIME AFTER A MEAL.     fluticasone propionate (FLONASE) 50 mcg/actuation nasal spray Use 2 sprays in each nostril every day as needed for runny nose/allergies    meloxicam (MOBIC) 15 mg tablet Take 1 tablet by mouth every day with food    metFORMIN ER (GLUCOPHAGE XR) 500 mg tablet Take 1 tablet by mouth twice daily    PARoxetine (PAXIL) 20 mg tablet Take 1 tablet by mouth every day    valsartan-hydroCHLOROthiazide (DIOVAN-HCT) 80-12.5 mg per tablet Take 1 tablet by mouth every day    zolpidem (AMBIEN) 5 mg, Oral, BEDTIME PRN        PHYSICAL EXAM  Physical Exam  Vitals and nursing note reviewed. Constitutional:       Appearance: Normal appearance. Cardiovascular:      Rate and Rhythm: Normal rate and regular rhythm. Pulses: Normal pulses. Heart sounds: Normal heart sounds. Pulmonary:      Effort: Pulmonary effort is normal.      Breath sounds: Normal breath sounds. Musculoskeletal:         General: Normal range of motion. Cervical back: Normal range of motion and neck supple. Skin:     General: Skin is warm and dry. Neurological:      Mental Status: She is alert and oriented to person, place, and time. Psychiatric:         Mood and Affect: Mood normal.         Behavior: Behavior normal.       Disclaimer: The patient understands our medical plan. Alternatives have been explained and offered. The risks, benefits and significant side effects of all medications have been reviewed. Anticipated time course and progression of condition reviewed. All questions have been addressed. She is encouraged to employ the information provided in the after visit summary, which was reviewed. Where applicable, she is instructed to call the clinic if she has not been notified either by phone or through [email or web address] with the results of her tests or with an appointment plan for any referrals within 1 week(s). No news is not good news; it's no news.  The patient  is to call if her condition worsens or fails to improve or if significant side effects are experienced. Aspects of this note may have been generated using voice recognition software. Despite editing, there may be unrecognized errors. condition worsens or fails to improve or if significant side effects are experienced. Please note that this dictation was completed with [employer], the computer voice recognition software. Quite often unanticipated grammatical, syntax, homophones, and other interpretive errors are inadvertently transcribed by the computer software. Please disregard these errors. Please excuse any errors that have escaped final proofreading.     Sophia Winter NP     11/2/2022

## 2022-11-03 ENCOUNTER — HOSPITAL ENCOUNTER (OUTPATIENT)
Dept: CT IMAGING | Age: 67
Discharge: HOME | End: 2022-11-03
Attending: NURSE PRACTITIONER
Payer: MEDICAID

## 2022-11-03 DIAGNOSIS — R63.4 UNINTENTIONAL WEIGHT LOSS: ICD-10-CM

## 2022-11-03 DIAGNOSIS — R61 NIGHT SWEATS: ICD-10-CM

## 2022-11-03 LAB — CREAT UR-MCNC: 1 MG/DL (ref 0.6–1.3)

## 2022-11-03 PROCEDURE — 71260 CT THORAX DX C+: CPT

## 2022-11-03 PROCEDURE — 82565 ASSAY OF CREATININE: CPT

## 2022-11-03 PROCEDURE — 74011000636 HC RX REV CODE- 636: Performed by: NURSE PRACTITIONER

## 2022-11-03 RX ADMIN — IOPAMIDOL 70 ML: 612 INJECTION, SOLUTION INTRAVENOUS at 07:25

## 2022-11-04 NOTE — PROGRESS NOTES
Please let patient know that there were some small lung nodules, that were incidental findings, and should only follow up if needed.

## 2022-12-14 DIAGNOSIS — G47.00 INSOMNIA, UNSPECIFIED TYPE: ICD-10-CM

## 2022-12-14 RX ORDER — ZOLPIDEM TARTRATE 5 MG/1
TABLET ORAL
Qty: 30 TABLET | Refills: 5 | Status: SHIPPED | OUTPATIENT
Start: 2022-12-14

## 2023-03-14 DIAGNOSIS — E11.9 TYPE 2 DIABETES MELLITUS WITHOUT COMPLICATION, WITHOUT LONG-TERM CURRENT USE OF INSULIN (HCC): ICD-10-CM

## 2023-03-14 DIAGNOSIS — E78.2 MIXED HYPERLIPIDEMIA: ICD-10-CM

## 2023-03-14 DIAGNOSIS — I10 ESSENTIAL (PRIMARY) HYPERTENSION: Primary | ICD-10-CM

## 2023-03-14 NOTE — TELEPHONE ENCOUNTER
Last Visit: 10- OV   Next Appointment: none  Previous Refill Encounter: valsartan-HCTZ on 05- #30tabs with 11 refills  Metformin on 05- #60 tabs with 11 refills   Atorvastatin on 05- #30tabs with 11 refills   Paxil on 05- #30tabs with 11 refills    Requested Prescriptions     Pending Prescriptions Disp Refills    valsartan (DIOVAN) 80 MG tablet [Pharmacy Med Name: Valsartan 80mg Tablet] 30 tablet 11     Sig: Take 1 tablet by mouth every day    hydroCHLOROthiazide (MICROZIDE) 12.5 MG capsule [Pharmacy Med Name: Hydrochlorothiazide 12.5mg Capsule] 30 capsule 11     Sig: Take 1 capsule by mouth every day    PARoxetine (PAXIL) 20 MG tablet [Pharmacy Med Name: Paroxetine Hydrochloride 20mg Tablet] 30 tablet 11     Sig: Take 1 tablet by mouth every day    atorvastatin (LIPITOR) 20 MG tablet [Pharmacy Med Name: Atorvastatin Calcium 20mg Tablet] 30 tablet 11     Sig: Take 1 tablet by mouth every day    metFORMIN (GLUCOPHAGE-XR) 500 MG extended release tablet [Pharmacy Med Name: Metformin Hydrochloride 500mg Extended-Release Tablet] 60 tablet 11     Sig: Take 1 tablet by mouth twice daily

## 2023-03-15 RX ORDER — ATORVASTATIN CALCIUM 20 MG/1
20 TABLET, FILM COATED ORAL
Qty: 30 TABLET | Refills: 1 | Status: SHIPPED | OUTPATIENT
Start: 2023-03-15

## 2023-03-15 RX ORDER — HYDROCHLOROTHIAZIDE 12.5 MG/1
12.5 CAPSULE, GELATIN COATED ORAL EVERY MORNING
Qty: 30 CAPSULE | Refills: 1 | Status: SHIPPED | OUTPATIENT
Start: 2023-03-15

## 2023-03-15 RX ORDER — PAROXETINE HYDROCHLORIDE 20 MG/1
20 TABLET, FILM COATED ORAL EVERY MORNING
Qty: 30 TABLET | Refills: 1 | Status: SHIPPED | OUTPATIENT
Start: 2023-03-15

## 2023-03-15 RX ORDER — METFORMIN HYDROCHLORIDE 500 MG/1
500 TABLET, EXTENDED RELEASE ORAL 2 TIMES DAILY WITH MEALS
Qty: 60 TABLET | Refills: 1 | Status: SHIPPED | OUTPATIENT
Start: 2023-03-15

## 2023-03-15 RX ORDER — VALSARTAN 80 MG/1
80 TABLET ORAL DAILY
Qty: 30 TABLET | Refills: 1 | Status: SHIPPED | OUTPATIENT
Start: 2023-03-15

## 2023-03-16 NOTE — TELEPHONE ENCOUNTER
Called patient to schedule visit. States she will call back at a later time.  Patient was advised of this being the last refill without an office visit

## 2023-05-13 DIAGNOSIS — E11.9 TYPE 2 DIABETES MELLITUS WITHOUT COMPLICATION, WITHOUT LONG-TERM CURRENT USE OF INSULIN (HCC): ICD-10-CM

## 2023-05-13 DIAGNOSIS — E78.2 MIXED HYPERLIPIDEMIA: ICD-10-CM

## 2023-05-13 DIAGNOSIS — I10 ESSENTIAL (PRIMARY) HYPERTENSION: ICD-10-CM

## 2023-05-15 RX ORDER — METFORMIN HYDROCHLORIDE 500 MG/1
TABLET, EXTENDED RELEASE ORAL
Qty: 60 TABLET | Refills: 0 | Status: SHIPPED | OUTPATIENT
Start: 2023-05-15

## 2023-05-15 RX ORDER — VALSARTAN 80 MG/1
TABLET ORAL
Qty: 30 TABLET | Refills: 0 | Status: SHIPPED | OUTPATIENT
Start: 2023-05-15 | End: 2023-06-05

## 2023-05-15 RX ORDER — HYDROCHLOROTHIAZIDE 12.5 MG/1
CAPSULE, GELATIN COATED ORAL
Qty: 30 CAPSULE | Refills: 0 | Status: SHIPPED | OUTPATIENT
Start: 2023-05-15 | End: 2023-06-05

## 2023-05-15 RX ORDER — ATORVASTATIN CALCIUM 20 MG/1
TABLET, FILM COATED ORAL
Qty: 30 TABLET | Refills: 0 | Status: SHIPPED | OUTPATIENT
Start: 2023-05-15

## 2023-05-15 RX ORDER — PAROXETINE HYDROCHLORIDE 20 MG/1
TABLET, FILM COATED ORAL
Qty: 30 TABLET | Refills: 0 | Status: SHIPPED | OUTPATIENT
Start: 2023-05-15

## 2023-05-15 RX ORDER — ASPIRIN 81 MG/1
TABLET, CHEWABLE ORAL
Qty: 30 TABLET | Refills: 0 | Status: SHIPPED | OUTPATIENT
Start: 2023-05-15

## 2023-06-05 PROBLEM — G56.00 CARPAL TUNNEL SYNDROME: Status: ACTIVE | Noted: 2023-06-05

## 2023-06-05 PROBLEM — M72.2 PLANTAR FASCIAL FIBROMATOSIS: Status: ACTIVE | Noted: 2023-06-05

## 2023-06-05 PROBLEM — M25.579 PAIN IN JOINT INVOLVING ANKLE AND FOOT: Status: ACTIVE | Noted: 2023-06-05

## 2023-06-05 PROBLEM — M77.30 HEEL SPUR: Status: ACTIVE | Noted: 2023-06-05

## 2023-06-05 PROBLEM — N95.1 SYMPTOMATIC MENOPAUSAL OR FEMALE CLIMACTERIC STATES: Status: ACTIVE | Noted: 2023-06-05

## 2023-06-05 RX ORDER — FEXOFENADINE HCL 180 MG/1
180 TABLET ORAL DAILY
COMMUNITY

## 2023-07-19 DIAGNOSIS — E78.2 MIXED HYPERLIPIDEMIA: ICD-10-CM

## 2023-07-19 DIAGNOSIS — E11.9 TYPE 2 DIABETES MELLITUS WITHOUT COMPLICATION, WITHOUT LONG-TERM CURRENT USE OF INSULIN (HCC): ICD-10-CM

## 2023-07-19 RX ORDER — ATORVASTATIN CALCIUM 20 MG/1
TABLET, FILM COATED ORAL
Qty: 90 TABLET | Refills: 1 | Status: SHIPPED | OUTPATIENT
Start: 2023-07-19

## 2023-07-19 RX ORDER — VALSARTAN 80 MG/1
TABLET ORAL
Qty: 90 TABLET | Refills: 1 | Status: SHIPPED | OUTPATIENT
Start: 2023-07-19

## 2023-07-19 RX ORDER — HYDROCHLOROTHIAZIDE 12.5 MG/1
CAPSULE, GELATIN COATED ORAL
Qty: 90 CAPSULE | Refills: 1 | Status: SHIPPED | OUTPATIENT
Start: 2023-07-19

## 2023-07-19 RX ORDER — PAROXETINE HYDROCHLORIDE 20 MG/1
TABLET, FILM COATED ORAL
Qty: 90 TABLET | Refills: 1 | Status: SHIPPED | OUTPATIENT
Start: 2023-07-19

## 2023-07-19 RX ORDER — METFORMIN HYDROCHLORIDE 500 MG/1
TABLET, EXTENDED RELEASE ORAL
Qty: 120 TABLET | Refills: 1 | Status: SHIPPED | OUTPATIENT
Start: 2023-07-19

## 2023-07-19 RX ORDER — ASPIRIN 81 MG/1
TABLET, CHEWABLE ORAL
Qty: 90 TABLET | Refills: 1 | Status: SHIPPED | OUTPATIENT
Start: 2023-07-19

## 2023-07-19 NOTE — TELEPHONE ENCOUNTER
Last Visit: 10- OV   Next Appointment: none      Requested Prescriptions     Pending Prescriptions Disp Refills    atorvastatin (LIPITOR) 20 MG tablet [Pharmacy Med Name: Atorvastatin Calcium 20mg Tablet] 30 tablet 11     Sig: Take 1 tablet by mouth every night.    hydroCHLOROthiazide (MICROZIDE) 12.5 MG capsule [Pharmacy Med Name: Hydrochlorothiazide 12.5mg Capsule] 30 capsule 11     Sig: Take 1 capsule by mouth every morning. valsartan (DIOVAN) 80 MG tablet [Pharmacy Med Name: Valsartan 80mg Tablet] 30 tablet 11     Sig: Take 1 tablet by mouth every day. PARoxetine (PAXIL) 20 MG tablet [Pharmacy Med Name: Paroxetine Hydrochloride 20mg Tablet] 30 tablet 11     Sig: Take 1 tablet by mouth every day.     aspirin 81 MG chewable tablet [Pharmacy Med Name: Aspirin 81mg Chewable Tablet] 30 tablet 11     Sig: Take 1 tablet by mouth every day    metFORMIN (GLUCOPHAGE-XR) 500 MG extended release tablet [Pharmacy Med Name: Metformin Hydrochloride 500mg Extended-Release Tablet] 60 tablet 11     Sig: Take 1 tab twice daily with food, AM & PM.

## 2023-08-01 RX ORDER — MELOXICAM 15 MG/1
TABLET ORAL
Qty: 90 TABLET | Refills: 1 | Status: SHIPPED | OUTPATIENT
Start: 2023-08-01

## 2023-08-01 RX ORDER — FLUTICASONE PROPIONATE 50 MCG
SPRAY, SUSPENSION (ML) NASAL
Qty: 16 EACH | Refills: 11 | Status: SHIPPED | OUTPATIENT
Start: 2023-08-01

## 2023-08-01 NOTE — TELEPHONE ENCOUNTER
Last Visit: 10/25/22   Next Appointment: None    Requested Prescriptions     Pending Prescriptions Disp Refills    fluticasone (FLONASE) 50 MCG/ACT nasal spray [Pharmacy Med Name: Fluticasone Prop 50mcg Nasal Spray] 16 each 11     Sig: Use 2 sprays into each nostril every day as needed for runny nose/allergies    meloxicam (MOBIC) 15 MG tablet [Pharmacy Med Name: Meloxicam 15mg Tablet] 30 tablet 11     Sig: Take 1 tablet by mouth every day with food

## 2023-10-10 DIAGNOSIS — E11.9 TYPE 2 DIABETES MELLITUS WITHOUT COMPLICATION, WITHOUT LONG-TERM CURRENT USE OF INSULIN (HCC): ICD-10-CM

## 2023-10-12 RX ORDER — METFORMIN HYDROCHLORIDE 500 MG/1
TABLET, EXTENDED RELEASE ORAL
Qty: 60 TABLET | Refills: 11 | OUTPATIENT
Start: 2023-10-12

## 2023-11-09 DIAGNOSIS — E11.9 TYPE 2 DIABETES MELLITUS WITHOUT COMPLICATION, WITHOUT LONG-TERM CURRENT USE OF INSULIN (HCC): ICD-10-CM

## 2023-11-13 RX ORDER — METFORMIN HYDROCHLORIDE 500 MG/1
TABLET, EXTENDED RELEASE ORAL
Qty: 60 TABLET | Refills: 11 | OUTPATIENT
Start: 2023-11-13

## 2023-12-01 NOTE — TELEPHONE ENCOUNTER
Last Visit: 10/25/22   Next Appointment: None    Requested Prescriptions     Pending Prescriptions Disp Refills    alendronate (FOSAMAX) 70 MG tablet [Pharmacy Med Name: ALENDRONATE SODIUM 70 MG TAB] 14 tablet      Sig: take 1 tablet by mouth every 7 days

## 2023-12-11 RX ORDER — ALENDRONATE SODIUM 70 MG/1
70 TABLET ORAL
Qty: 14 TABLET | OUTPATIENT
Start: 2023-12-11

## 2024-01-05 RX ORDER — ALENDRONATE SODIUM 70 MG/1
70 TABLET ORAL
Qty: 14 TABLET | OUTPATIENT
Start: 2024-01-05